# Patient Record
Sex: FEMALE | Race: WHITE | URBAN - METROPOLITAN AREA
[De-identification: names, ages, dates, MRNs, and addresses within clinical notes are randomized per-mention and may not be internally consistent; named-entity substitution may affect disease eponyms.]

---

## 2020-04-29 ENCOUNTER — TELEPHONE (OUTPATIENT)
Dept: PRIMARY CARE CLINIC | Age: 80
End: 2020-04-29

## 2020-04-29 NOTE — TELEPHONE ENCOUNTER
Attempted to call patient on [phone] and the number on her chart. I left 3 messages asking patient to call office to update number so office can get ahold of patient to start VV. Devin Murdock MA sent a my chart message as well with same info. If patient calls back please get the correct number  And update chart.

## 2020-05-01 ENCOUNTER — TELEMEDICINE (OUTPATIENT)
Dept: PRIMARY CARE CLINIC | Age: 80
End: 2020-05-01
Payer: COMMERCIAL

## 2020-05-01 ENCOUNTER — TELEPHONE (OUTPATIENT)
Dept: PRIMARY CARE CLINIC | Age: 80
End: 2020-05-01

## 2020-05-01 VITALS
DIASTOLIC BLOOD PRESSURE: 69 MMHG | SYSTOLIC BLOOD PRESSURE: 111 MMHG | HEART RATE: 97 BPM | WEIGHT: 153 LBS | HEIGHT: 62 IN | BODY MASS INDEX: 28.16 KG/M2

## 2020-05-01 PROBLEM — M54.9 BACKACHE: Status: ACTIVE | Noted: 2020-05-01

## 2020-05-01 PROBLEM — E11.9 TYPE 2 DIABETES MELLITUS (HCC): Status: ACTIVE | Noted: 2020-05-01

## 2020-05-01 PROBLEM — I99.8 VASCULAR INSUFFICIENCY: Status: ACTIVE | Noted: 2020-05-01

## 2020-05-01 PROBLEM — Z92.3 HISTORY OF RADIATION THERAPY: Status: ACTIVE | Noted: 2020-05-01

## 2020-05-01 PROBLEM — M79.605 PAIN OF LEFT LEG: Status: ACTIVE | Noted: 2020-05-01

## 2020-05-01 PROBLEM — E11.40 DIABETIC NEUROPATHY (HCC): Status: ACTIVE | Noted: 2020-05-01

## 2020-05-01 PROBLEM — I10 ESSENTIAL HYPERTENSION: Status: ACTIVE | Noted: 2020-05-01

## 2020-05-01 PROCEDURE — 99205 OFFICE O/P NEW HI 60 MIN: CPT | Performed by: PHYSICIAN ASSISTANT

## 2020-05-01 PROCEDURE — G0444 DEPRESSION SCREEN ANNUAL: HCPCS | Performed by: PHYSICIAN ASSISTANT

## 2020-05-01 RX ORDER — GLIMEPIRIDE 2 MG/1
2 TABLET ORAL EVERY 12 HOURS
Qty: 30 TABLET | Refills: 0 | Status: SHIPPED
Start: 2020-05-01 | End: 2020-05-01 | Stop reason: DRUGHIGH

## 2020-05-01 RX ORDER — GLIMEPIRIDE 2 MG/1
TABLET ORAL
COMMUNITY
Start: 2020-04-16 | End: 2020-05-01 | Stop reason: DRUGHIGH

## 2020-05-01 RX ORDER — GLIMEPIRIDE 2 MG/1
4 TABLET ORAL EVERY 12 HOURS
Qty: 30 TABLET | Refills: 0 | Status: SHIPPED
Start: 2020-05-01 | End: 2021-11-16 | Stop reason: SDUPTHER

## 2020-05-01 RX ORDER — TRAMADOL HYDROCHLORIDE 50 MG/1
50 TABLET ORAL EVERY 4 HOURS PRN
Qty: 30 TABLET | Refills: 0 | Status: SHIPPED | OUTPATIENT
Start: 2020-05-01 | End: 2020-12-15 | Stop reason: SDUPTHER

## 2020-05-01 RX ORDER — INSULIN DETEMIR 100 [IU]/ML
10 INJECTION, SOLUTION SUBCUTANEOUS NIGHTLY
Qty: 5 PEN | Refills: 3 | Status: SHIPPED | OUTPATIENT
Start: 2020-05-01 | End: 2020-12-15

## 2020-05-01 RX ORDER — INSULIN DETEMIR 100 [IU]/ML
INJECTION, SOLUTION SUBCUTANEOUS
COMMUNITY
Start: 2020-04-24 | End: 2020-05-01 | Stop reason: ALTCHOICE

## 2020-05-01 RX ORDER — LINAGLIPTIN 5 MG/1
5 TABLET, FILM COATED ORAL DAILY
Qty: 30 TABLET | Refills: 0 | Status: SHIPPED
Start: 2020-05-01 | End: 2022-04-26 | Stop reason: SDUPTHER

## 2020-05-01 RX ORDER — ASPIRIN 81 MG/1
1 TABLET, CHEWABLE ORAL DAILY
COMMUNITY
End: 2020-07-23 | Stop reason: ALTCHOICE

## 2020-05-01 RX ORDER — LISINOPRIL 10 MG/1
TABLET ORAL
COMMUNITY
Start: 2020-02-17 | End: 2021-01-05 | Stop reason: SDUPTHER

## 2020-05-01 RX ORDER — LINAGLIPTIN 5 MG/1
TABLET, FILM COATED ORAL
COMMUNITY
Start: 2020-04-02 | End: 2020-05-01 | Stop reason: DRUGHIGH

## 2020-05-01 ASSESSMENT — PATIENT HEALTH QUESTIONNAIRE - PHQ9
6. FEELING BAD ABOUT YOURSELF - OR THAT YOU ARE A FAILURE OR HAVE LET YOURSELF OR YOUR FAMILY DOWN: 2
9. THOUGHTS THAT YOU WOULD BE BETTER OFF DEAD, OR OF HURTING YOURSELF: 0
SUM OF ALL RESPONSES TO PHQ QUESTIONS 1-9: 13
7. TROUBLE CONCENTRATING ON THINGS, SUCH AS READING THE NEWSPAPER OR WATCHING TELEVISION: 2
10. IF YOU CHECKED OFF ANY PROBLEMS, HOW DIFFICULT HAVE THESE PROBLEMS MADE IT FOR YOU TO DO YOUR WORK, TAKE CARE OF THINGS AT HOME, OR GET ALONG WITH OTHER PEOPLE: 2
SUM OF ALL RESPONSES TO PHQ9 QUESTIONS 1 & 2: 4
SUM OF ALL RESPONSES TO PHQ QUESTIONS 1-9: 13
8. MOVING OR SPEAKING SO SLOWLY THAT OTHER PEOPLE COULD HAVE NOTICED. OR THE OPPOSITE, BEING SO FIGETY OR RESTLESS THAT YOU HAVE BEEN MOVING AROUND A LOT MORE THAN USUAL: 1
3. TROUBLE FALLING OR STAYING ASLEEP: 2
1. LITTLE INTEREST OR PLEASURE IN DOING THINGS: 2
5. POOR APPETITE OR OVEREATING: 0
2. FEELING DOWN, DEPRESSED OR HOPELESS: 2
4. FEELING TIRED OR HAVING LITTLE ENERGY: 2

## 2020-05-01 ASSESSMENT — ENCOUNTER SYMPTOMS
VOICE CHANGE: 0
VOMITING: 0
COUGH: 0
ABDOMINAL PAIN: 0
EYE ITCHING: 0
BLOOD IN STOOL: 0
CONSTIPATION: 0
DIARRHEA: 0
NAUSEA: 0
TROUBLE SWALLOWING: 0
COLOR CHANGE: 0
CHEST TIGHTNESS: 0
BACK PAIN: 0
SHORTNESS OF BREATH: 0
EYE PAIN: 0

## 2020-05-01 NOTE — PROGRESS NOTES
[facility] PRIMARY CARE  [address] [place] [id number]  Dept: Cinthya is a 78 y.o. female who presents today as an new patient for her medical conditionsas noted below. Chief Complaint   Patient presents with    New Patient     Patient use to see Elsa Santoyo with [facility].  Other     Patient was in [facility] for 1 week, last week. 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 Patient was given predinsone but has stopped taking today due to it making her legs feel heavy.  Diabetes     patient has been having high sugar reading     Other     patient consents to telehealth services. HPI:     HPI  VV takes place between myself, Michael and Leobardo, her daughter  from her home and my office     Patient is Type 2 diabetic. Last A1C was ? Not checking sugar readings. New meter won't read---too high. She has dry mouth, is very thristy and urinating a lot. Was at [facility] for blindness in left eye. Did temporal artery biopsy bilateral--awaiting results. Seeing ophthalmology soon. Prednisone gave her side effects so she stopped it. Leg pain has been bad \"for years\". Pain level is 9/10 and leg is weak. Worse at night. Gets N/T into the left leg off and on. Back bothers her with leg pain. Tylenol 500mg at night seems to help.    No redness or warmth and it is not swollen compared to the right per daughter    No results found for: Doris Marvin    (goal LDL is <100)   No results found for: AST, ALT, BUN  BP Readings from Last 3 Encounters:   05/01/20 111/69          (goal 120/80)    Past Medical History:   Diagnosis Date    Cancer [facility])     breast cancer    Hypertension     Ischemic optic neuropathy     Type 2 diabetes mellitus without complication ([facility] [address]       Past Surgical History:   Procedure Laterality Date    BACK SURGERY      BREAST polyuria. Negative for cold intolerance and heat intolerance. Genitourinary: Negative for difficulty urinating, dysuria, flank pain, frequency, hematuria, menstrual problem, pelvic pain, urgency, vaginal bleeding, vaginal discharge and vaginal pain. Musculoskeletal: Positive for arthralgias (left leg and knee). Negative for back pain, joint swelling and myalgias. Skin: Negative for color change, rash and wound. Neurological: Positive for weakness. Negative for dizziness, syncope, speech difficulty, light-headedness and headaches. Hematological: Does not bruise/bleed easily. Psychiatric/Behavioral: Negative for dysphoric mood and sleep disturbance. The patient is not nervous/anxious. Objective:     /69 (Site: Right Upper Arm)   Pulse 97   Ht 5' 2\" (1.575 m)   Wt 153 lb (69.4 kg)   BMI 27.98 kg/m²   Physical Exam  Vitals signs and nursing note reviewed. Constitutional:       Appearance: Normal appearance. Pulmonary:      Effort: Pulmonary effort is normal. No respiratory distress. Musculoskeletal:      Right lower leg: No edema. Left lower leg: No edema. Comments: Had daughter push on toes for cap refill which appears slow and daughter says the left foot is cooler than the other. She has a collection of spider vein clusters around ankle of the left foot. Skin:     Findings: No rash. Neurological:      Mental Status: She is alert and oriented to person, place, and time. Comments: Has some difficulty hearing. Psychiatric:         Mood and Affect: Mood normal.         Assessment:       Diagnosis Orders   1. Pain of left leg  CBC Auto Differential   2. Essential hypertension  Comprehensive Metabolic Panel, Fasting   3. Type 2 diabetes mellitus with hyperglycemia, without long-term current use of insulin ([facility])  insulin detemir (LEVEMIR FLEXTOUCH) 100 UNIT/ML injection pen    Comprehensive Metabolic Panel, Fasting    Hemoglobin A1C   4.  Diabetic mononeuropathy

## 2020-05-27 ENCOUNTER — TELEPHONE (OUTPATIENT)
Dept: PRIMARY CARE CLINIC | Age: 80
End: 2020-05-27

## 2020-05-27 NOTE — TELEPHONE ENCOUNTER
[facility] calling asking if you will follow patient for home health she is being discharged from [facility]. [phone]

## 2020-06-26 ENCOUNTER — TELEPHONE (OUTPATIENT)
Dept: PRIMARY CARE CLINIC | Age: 80
End: 2020-06-26

## 2020-07-01 ENCOUNTER — TELEPHONE (OUTPATIENT)
Dept: PRIMARY CARE CLINIC | Age: 80
End: 2020-07-01

## 2020-07-01 NOTE — TELEPHONE ENCOUNTER
Pt had ilateral leg edema yesterday, home nurse boyd for [facility] and it was sent to [facility] to be ran.

## 2020-07-02 ENCOUNTER — TELEPHONE (OUTPATIENT)
Dept: PRIMARY CARE CLINIC | Age: 80
End: 2020-07-02

## 2020-07-02 NOTE — TELEPHONE ENCOUNTER
Afia from [employer]. Pt has +2 edema on both lower extremities. Pt not on any water pills due to her kidneys. Uses [facility] in [place] listed.  [phone] Afia's#

## 2020-07-10 LAB
ALBUMIN SERPL-MCNC: NORMAL G/DL
ALP BLD-CCNC: NORMAL U/L
ALT SERPL-CCNC: NORMAL U/L
AST SERPL-CCNC: NORMAL U/L
AVERAGE GLUCOSE: NORMAL
BASOPHILS ABSOLUTE: NORMAL
BASOPHILS RELATIVE PERCENT: NORMAL
BILIRUB SERPL-MCNC: NORMAL MG/DL
BUN BLDV-MCNC: NORMAL MG/DL
CALCIUM SERPL-MCNC: NORMAL MG/DL
CHLORIDE BLD-SCNC: NORMAL MMOL/L
CHOLESTEROL, TOTAL: 211 MG/DL
CHOLESTEROL/HDL RATIO: 7
CO2: NORMAL
CREAT SERPL-MCNC: NORMAL MG/DL
EOSINOPHILS ABSOLUTE: NORMAL
EOSINOPHILS RELATIVE PERCENT: NORMAL
GLUCOSE FASTING: 102 MG/DL
HBA1C MFR BLD: 6.3 %
HCT VFR BLD CALC: NORMAL %
HDLC SERPL-MCNC: 30 MG/DL (ref 35–70)
HEMOGLOBIN: NORMAL
LDL CHOLESTEROL CALCULATED: 141 MG/DL (ref 0–160)
LYMPHOCYTES ABSOLUTE: NORMAL
LYMPHOCYTES RELATIVE PERCENT: NORMAL
MCH RBC QN AUTO: NORMAL PG
MCHC RBC AUTO-ENTMCNC: NORMAL G/DL
MCV RBC AUTO: NORMAL FL
MONOCYTES ABSOLUTE: NORMAL
MONOCYTES RELATIVE PERCENT: NORMAL
NEUTROPHILS ABSOLUTE: NORMAL
NEUTROPHILS RELATIVE PERCENT: NORMAL
PDW BLD-RTO: NORMAL %
PLATELET # BLD: NORMAL 10*3/UL
PMV BLD AUTO: NORMAL FL
POTASSIUM SERPL-SCNC: NORMAL MMOL/L
RBC # BLD: NORMAL 10*6/UL
SODIUM BLD-SCNC: NORMAL MMOL/L
TOTAL PROTEIN: NORMAL
TRIGL SERPL-MCNC: 198 MG/DL
VLDLC SERPL CALC-MCNC: 40 MG/DL
WBC # BLD: NORMAL 10*3/UL

## 2020-07-23 ENCOUNTER — OFFICE VISIT (OUTPATIENT)
Dept: PRIMARY CARE CLINIC | Age: 80
End: 2020-07-23
Payer: COMMERCIAL

## 2020-07-23 VITALS
HEART RATE: 95 BPM | HEIGHT: 62 IN | DIASTOLIC BLOOD PRESSURE: 74 MMHG | TEMPERATURE: 98.1 F | SYSTOLIC BLOOD PRESSURE: 122 MMHG | OXYGEN SATURATION: 98 % | WEIGHT: 145 LBS | BODY MASS INDEX: 26.68 KG/M2

## 2020-07-23 PROBLEM — H26.9 CATARACT: Status: ACTIVE | Noted: 2020-07-23

## 2020-07-23 LAB
BASOPHILS ABSOLUTE: NORMAL
BASOPHILS RELATIVE PERCENT: NORMAL
BILIRUBIN, POC: NORMAL
BLOOD URINE, POC: NORMAL
BUN BLDV-MCNC: NORMAL MG/DL
CALCIUM SERPL-MCNC: NORMAL MG/DL
CHLORIDE BLD-SCNC: NORMAL MMOL/L
CLARITY, POC: NORMAL
CO2: NORMAL
COLOR, POC: NORMAL
CREAT SERPL-MCNC: NORMAL MG/DL
EOSINOPHILS ABSOLUTE: NORMAL
EOSINOPHILS RELATIVE PERCENT: NORMAL
GFR CALCULATED: NORMAL
GLUCOSE BLD-MCNC: NORMAL MG/DL
GLUCOSE URINE, POC: NORMAL
HCT VFR BLD CALC: NORMAL %
HEMOGLOBIN: NORMAL
KETONES, POC: NORMAL
LEUKOCYTE EST, POC: NORMAL
LYMPHOCYTES ABSOLUTE: NORMAL
LYMPHOCYTES RELATIVE PERCENT: NORMAL
MCH RBC QN AUTO: NORMAL PG
MCHC RBC AUTO-ENTMCNC: NORMAL G/DL
MCV RBC AUTO: NORMAL FL
MONOCYTES ABSOLUTE: NORMAL
MONOCYTES RELATIVE PERCENT: NORMAL
NEUTROPHILS ABSOLUTE: NORMAL
NEUTROPHILS RELATIVE PERCENT: NORMAL
NITRITE, POC: NORMAL
PDW BLD-RTO: NORMAL %
PH, POC: 6.5
PLATELET # BLD: NORMAL 10*3/UL
PMV BLD AUTO: NORMAL FL
POTASSIUM SERPL-SCNC: NORMAL MMOL/L
PROTEIN, POC: NORMAL
RBC # BLD: NORMAL 10*6/UL
SODIUM BLD-SCNC: NORMAL MMOL/L
SPECIFIC GRAVITY, POC: 1.02
UROBILINOGEN, POC: NORMAL
WBC # BLD: NORMAL 10*3/UL

## 2020-07-23 PROCEDURE — 99215 OFFICE O/P EST HI 40 MIN: CPT | Performed by: PHYSICIAN ASSISTANT

## 2020-07-23 PROCEDURE — 81003 URINALYSIS AUTO W/O SCOPE: CPT | Performed by: PHYSICIAN ASSISTANT

## 2020-07-23 RX ORDER — ATORVASTATIN CALCIUM 40 MG/1
40 TABLET, FILM COATED ORAL DAILY
Qty: 30 TABLET | Refills: 5 | Status: SHIPPED | OUTPATIENT
Start: 2020-07-23 | End: 2020-12-23

## 2020-07-23 RX ORDER — ATORVASTATIN CALCIUM 40 MG/1
40 TABLET, FILM COATED ORAL NIGHTLY
Qty: 90 TABLET | Refills: 0 | Status: CANCELLED | OUTPATIENT
Start: 2020-07-23

## 2020-07-23 RX ORDER — GABAPENTIN 100 MG/1
100 CAPSULE ORAL 3 TIMES DAILY
COMMUNITY
Start: 2020-06-24 | End: 2020-07-23 | Stop reason: SDUPTHER

## 2020-07-23 RX ORDER — SULFAMETHOXAZOLE AND TRIMETHOPRIM 800; 160 MG/1; MG/1
1 TABLET ORAL 2 TIMES DAILY
Qty: 14 TABLET | Refills: 0 | Status: SHIPPED | OUTPATIENT
Start: 2020-07-23 | End: 2020-07-30

## 2020-07-23 RX ORDER — LANOLIN ALCOHOL/MO/W.PET/CERES
325 CREAM (GRAM) TOPICAL 2 TIMES DAILY
Qty: 90 TABLET | Refills: 3
Start: 2020-07-23 | End: 2021-05-04 | Stop reason: ALTCHOICE

## 2020-07-23 RX ORDER — GABAPENTIN 100 MG/1
100 CAPSULE ORAL 3 TIMES DAILY
Qty: 90 CAPSULE | Refills: 0 | Status: SHIPPED | OUTPATIENT
Start: 2020-07-23 | End: 2021-01-05 | Stop reason: ALTCHOICE

## 2020-07-23 ASSESSMENT — ENCOUNTER SYMPTOMS
SHORTNESS OF BREATH: 0
WHEEZING: 0
RESPIRATORY NEGATIVE: 1
COUGH: 0

## 2020-07-23 NOTE — PROGRESS NOTES
atorvastatin (LIPITOR) 40 MG tablet Take 1 tablet by mouth daily 30 tablet 5    ferrous sulfate (FE TABS) 325 (65 Fe) MG EC tablet Take 1 tablet by mouth 2 times daily 90 tablet 3    gabapentin (NEURONTIN) 100 MG capsule Take 1 capsule by mouth 3 times daily for 30 days. 90 capsule 0    sulfamethoxazole-trimethoprim (BACTRIM DS) 800-160 MG per tablet Take 1 tablet by mouth 2 times daily for 7 days 14 tablet 0    TRADJENTA 5 MG tablet 1 tablet daily 30 tablet 0    glimepiride (AMARYL) 2 MG tablet 2 tablets every 12 hours 30 tablet 0    insulin detemir (LEVEMIR FLEXTOUCH) 100 UNIT/ML injection pen Inject 10 Units into the skin nightly 5 pen 3    lisinopril (PRINIVIL;ZESTRIL) 10 MG tablet lisinopril 10 mg tablet       No current facility-administered medications for this visit. No Known Allergies    Health Maintenance   Topic Date Due    DTaP/Tdap/Td vaccine (1 - Tdap) 09/05/1959    Shingles Vaccine (1 of 2) 09/05/1990    DEXA (modify frequency per FRAX score)  09/05/1995    Annual Wellness Visit (AWV)  07/15/2020    Flu vaccine (1) 09/01/2020    Potassium monitoring  07/10/2021    Creatinine monitoring  07/10/2021    Pneumococcal 65+ years Vaccine  Completed    Hepatitis A vaccine  Aged Out    Hib vaccine  Aged Out    Meningococcal (ACWY) vaccine  Aged Out       Subjective:      Review of Systems   Constitutional: Positive for fatigue. Negative for chills, diaphoresis and fever. HENT: Negative. Respiratory: Negative. Negative for cough, shortness of breath and wheezing. Cardiovascular: Negative. Endocrine: Negative. Genitourinary: Negative for hematuria and vaginal bleeding. Skin: Positive for pallor. Neurological: Positive for weakness. Negative for dizziness, syncope and light-headedness.        Objective:     /74   Pulse 95   Temp 98.1 °F (36.7 °C)   Ht 5' 2\" (1.575 m)   Wt 145 lb (65.8 kg)   SpO2 98%   BMI 26.52 kg/m²   Physical Exam  Vitals signs and nursing note reviewed. Constitutional:       Appearance: Normal appearance. She is not ill-appearing. Cardiovascular:      Rate and Rhythm: Normal rate and regular rhythm. Heart sounds: Normal heart sounds. Pulmonary:      Effort: Pulmonary effort is normal.      Breath sounds: Normal breath sounds. Abdominal:      General: Abdomen is flat. Bowel sounds are normal. There is no distension. Tenderness: There is no abdominal tenderness. There is no guarding or rebound. Musculoskeletal:      Right lower leg: No edema. Left lower leg: No edema. Neurological:      Mental Status: She is alert and oriented to person, place, and time. Psychiatric:         Mood and Affect: Mood normal.         Assessment:       Diagnosis Orders   1. Type 2 diabetes mellitus with hyperglycemia, without long-term current use of insulin ([facility] [address]     2. Hyperlipidemia, unspecified hyperlipidemia type  Lipid Panel    ALT    AST   3. Essential hypertension  Basic Metabolic Panel   4. Low hemoglobin  CBC Auto Differential   5. Urinary catheter present  POCT Urinalysis No Micro (Auto)   6. Urinary tract infection symptoms  Culture, Urine        Plan:    Reviewed [facility] records  Urine today shows UTI--complete Bactrim and sent for culture. CBC and BMP drawn today  Continue iron--may stop to to GI upset if blood work is better. Hydrate well   Start Lipitor 40mg 1 po qhs  Labs in 6 weeks. Spent 45 minutes with patient today  Return in about 3 months (around 10/23/2020) for Diabetes, HTN. Orders Placed This Encounter   Procedures    Culture, Urine     Standing Status:   Future     Standing Expiration Date:   7/23/2021     Order Specific Question:   Specify (ex-cath, midstream, cysto, etc)?      Answer:   midstream    Basic Metabolic Panel     Standing Status:   Future     Standing Expiration Date:   7/23/2021    CBC Auto Differential     Standing Status:   Future     Standing Expiration Date:   7/23/2021    Lipid Panel Standing Status:   Future     Standing Expiration Date:   7/23/2021     Order Specific Question:   Is Patient Fasting?/# of Hours     Answer:   10-12 hours    ALT     Standing Status:   Future     Standing Expiration Date:   7/23/2021    AST     Standing Status:   Future     Standing Expiration Date:   7/23/2021    POCT Urinalysis No Micro (Auto)     Orders Placed This Encounter   Medications    atorvastatin (LIPITOR) 40 MG tablet     Sig: Take 1 tablet by mouth daily     Dispense:  30 tablet     Refill:  5    ferrous sulfate (FE TABS) 325 (65 Fe) MG EC tablet     Sig: Take 1 tablet by mouth 2 times daily     Dispense:  90 tablet     Refill:  3    gabapentin (NEURONTIN) 100 MG capsule     Sig: Take 1 capsule by mouth 3 times daily for 30 days. Dispense:  90 capsule     Refill:  0    sulfamethoxazole-trimethoprim (BACTRIM DS) 800-160 MG per tablet     Sig: Take 1 tablet by mouth 2 times daily for 7 days     Dispense:  14 tablet     Refill:  0       Patient given educationalmaterials - see patient instructions. Discussed use, benefit, and side effectsof prescribed medications. All patient questions answered. Pt voiced understanding. Reviewed health maintenance. Instructed to continue current medications, diet andexercise. Patient agreed with treatment plan. Follow up as directed.      Electronicallysigned by Carmine Hayes PA-C on 7/23/2020 at 12:41 PM

## 2020-08-20 LAB
ABSOLUTE BASO #: 0.1 X10E9/L (ref 0–0.2)
ABSOLUTE EOS #: 0.3 X10E9/L (ref 0–0.4)
ABSOLUTE LYMPH #: 2 X10E9/L (ref 1–3.5)
ABSOLUTE MONO #: 0.4 X10E9/L (ref 0–0.9)
ABSOLUTE NEUT #: 6.8 X10E9/L (ref 1.5–6.6)
ANION GAP SERPL CALCULATED.3IONS-SCNC: 12 MMOL/L (ref 5–15)
BASOPHILS RELATIVE PERCENT: 1.2 %
BUN BLDV-MCNC: 32 MG/DL (ref 5–27)
CALCIUM SERPL-MCNC: 9.4 MG/DL (ref 8.5–10.5)
CHLORIDE BLD-SCNC: 111 MMOL/L (ref 98–109)
CO2: 22 MMOL/L (ref 22–32)
CREAT SERPL-MCNC: 1.36 MG/DL (ref 0.4–1)
EGFR AFRICAN AMERICAN: 45 ML/MIN/1.73SQ.M
EGFR IF NONAFRICAN AMERICAN: 38 ML/MIN/1.73SQ.M
EOSINOPHILS RELATIVE PERCENT: 3.4 %
GLUCOSE: 191 MG/DL (ref 65–99)
HCT VFR BLD CALC: 32.3 % (ref 35–47)
HEMOGLOBIN: 11 G/DL (ref 11.7–15.5)
LYMPHOCYTE %: 21 %
MCH RBC QN AUTO: 30.4 PG (ref 27–34)
MCHC RBC AUTO-ENTMCNC: 34.1 G/DL (ref 32–36)
MCV RBC AUTO: 89 FL (ref 80–100)
MONOCYTES # BLD: 4.3 %
NEUTROPHILS RELATIVE PERCENT: 70.1 %
PDW BLD-RTO: 13.8 % (ref 11.5–15)
PLATELETS: 196 X10E9/L (ref 150–450)
PMV BLD AUTO: 10.2 FL (ref 7–12)
POTASSIUM SERPL-SCNC: 5.2 MMOL/L (ref 3.5–5)
RBC: 3.63 X10E12/L (ref 3.8–5.2)
SODIUM BLD-SCNC: 145 MMOL/L (ref 134–146)
WBC: 9.6 X10E9/L (ref 4–11)

## 2020-09-03 ENCOUNTER — TELEPHONE (OUTPATIENT)
Dept: PRIMARY CARE CLINIC | Age: 80
End: 2020-09-03

## 2020-09-03 NOTE — TELEPHONE ENCOUNTER
Bashir Samuel [facility] calling and states that Usama Castillo will not be able to follow pt for [facility] and is asking which MD would be willing to follow for [facility]. Please advise.

## 2020-09-04 ENCOUNTER — TELEPHONE (OUTPATIENT)
Dept: PRIMARY CARE CLINIC | Age: 80
End: 2020-09-04

## 2020-09-04 NOTE — TELEPHONE ENCOUNTER
Gina Goetz calling back to check on the status of this request- Gina Goetz notified that Dr. Luis Gee will sign/follow

## 2020-09-11 LAB
BUN BLDV-MCNC: NORMAL MG/DL
CALCIUM SERPL-MCNC: NORMAL MG/DL
CHLORIDE BLD-SCNC: NORMAL MMOL/L
CO2: NORMAL
CREAT SERPL-MCNC: NORMAL MG/DL
GFR CALCULATED: NORMAL
GLUCOSE BLD-MCNC: NORMAL MG/DL
HCT VFR BLD CALC: NORMAL %
HEMOGLOBIN: NORMAL
POTASSIUM SERPL-SCNC: NORMAL MMOL/L
SODIUM BLD-SCNC: NORMAL MMOL/L

## 2020-09-25 ENCOUNTER — TELEPHONE (OUTPATIENT)
Dept: PRIMARY CARE CLINIC | Age: 80
End: 2020-09-25

## 2020-09-25 NOTE — TELEPHONE ENCOUNTER
Pt canceled her P.T. appt today per Sandy Yip at [facility]. due to her son being in from out of town and wants to spend time with him.

## 2020-10-10 LAB
ANION GAP SERPL CALCULATED.3IONS-SCNC: 9 MMOL/L (ref 5–15)
BUN BLDV-MCNC: 34 MG/DL (ref 5–27)
CALCIUM SERPL-MCNC: 9.4 MG/DL (ref 8.5–10.5)
CHLORIDE BLD-SCNC: 105 MMOL/L (ref 98–109)
CO2: 25 MMOL/L (ref 22–32)
CREAT SERPL-MCNC: 1.35 MG/DL (ref 0.4–1)
EGFR AFRICAN AMERICAN: 46 ML/MIN/1.73SQ.M
EGFR IF NONAFRICAN AMERICAN: 38 ML/MIN/1.73SQ.M
GLUCOSE: 131 MG/DL (ref 65–99)
POTASSIUM SERPL-SCNC: 4.4 MMOL/L (ref 3.5–5)
SODIUM BLD-SCNC: 139 MMOL/L (ref 134–146)

## 2020-11-05 LAB
ANION GAP SERPL CALCULATED.3IONS-SCNC: 11 MMOL/L (ref 5–15)
BUN BLDV-MCNC: 21 MG/DL (ref 5–27)
CALCIUM SERPL-MCNC: 9 MG/DL (ref 8.5–10.5)
CHLORIDE BLD-SCNC: 107 MMOL/L (ref 98–109)
CO2: 24 MMOL/L (ref 22–32)
CREAT SERPL-MCNC: 1.17 MG/DL (ref 0.4–1)
EGFR AFRICAN AMERICAN: 54 ML/MIN/1.73SQ.M
EGFR IF NONAFRICAN AMERICAN: 45 ML/MIN/1.73SQ.M
GLUCOSE: 195 MG/DL (ref 65–99)
POTASSIUM SERPL-SCNC: 4.2 MMOL/L (ref 3.5–5)
SODIUM BLD-SCNC: 142 MMOL/L (ref 134–146)

## 2020-12-14 ENCOUNTER — NURSE TRIAGE (OUTPATIENT)
Dept: OTHER | Facility: CLINIC | Age: 80
End: 2020-12-14

## 2020-12-14 NOTE — TELEPHONE ENCOUNTER
Patient called pre-service center [facility] with red flag complaint. Brief description of triage: worsening low back pain, blood on toilet paper after wiping when urinates. Triage indicates for patient to be seen today. Care advice provided, patient verbalizes understanding; denies any other questions or concerns; instructed to call back for any new or worsening symptoms. Writer provided warm transfer to [place] at [facility] [place] for appointment scheduling. Attention Provider: Thank you for allowing me to participate in the care of your patient. The patient was connected to triage in response to information provided to the [facility]. Please do not respond through this encounter as the response is not directed to a shared pool. Reason for Disposition   Side (flank) or lower back pain present    Answer Assessment - Initial Assessment Questions  1. SYMPTOM: \"What's the main symptom you're concerned about? \" (e.g., frequency, incontinence)      Low back pain. 2. ONSET: \"When did the  Low back pain start? \"      Started 2 weeks ago. 3. PAIN: \"Is there any pain? \" If so, ask: \"How bad is it? \" (Scale: 1-10; mild, moderate, severe)      Yes, back pain. 8/10    4. CAUSE: \"What do you think is causing the symptoms? \"      \"UTI\"    5. OTHER SYMPTOMS: \"Do you have any other symptoms? \" (e.g., fever, flank pain, blood in urine, pain with urination)      Blood on toilet paper when wipes after urinating. 6. PREGNANCY: \"Is there any chance you are pregnant? \" \"When was your last menstrual period? \"      No.    Protocols used: URINARY SYMPTOMS-ADULT-[place]

## 2020-12-15 ENCOUNTER — OFFICE VISIT (OUTPATIENT)
Dept: PRIMARY CARE CLINIC | Age: 80
End: 2020-12-15
Payer: COMMERCIAL

## 2020-12-15 VITALS
BODY MASS INDEX: 27.79 KG/M2 | TEMPERATURE: 97.1 F | HEART RATE: 105 BPM | SYSTOLIC BLOOD PRESSURE: 128 MMHG | DIASTOLIC BLOOD PRESSURE: 80 MMHG | WEIGHT: 151 LBS | HEIGHT: 62 IN | OXYGEN SATURATION: 98 %

## 2020-12-15 LAB
BILIRUBIN, POC: NORMAL
BLOOD URINE, POC: NORMAL
CLARITY, POC: NORMAL
COLOR, POC: NORMAL
GLUCOSE URINE, POC: NORMAL
HBA1C MFR BLD: 9.9 %
KETONES, POC: NORMAL
LEUKOCYTE EST, POC: NORMAL
NITRITE, POC: NORMAL
PH, POC: 6
PROTEIN, POC: NORMAL
SPECIFIC GRAVITY, POC: 1.01
UROBILINOGEN, POC: 0.2

## 2020-12-15 PROCEDURE — 99214 OFFICE O/P EST MOD 30 MIN: CPT | Performed by: PHYSICIAN ASSISTANT

## 2020-12-15 PROCEDURE — 90694 VACC AIIV4 NO PRSRV 0.5ML IM: CPT | Performed by: PHYSICIAN ASSISTANT

## 2020-12-15 PROCEDURE — G0008 ADMIN INFLUENZA VIRUS VAC: HCPCS | Performed by: PHYSICIAN ASSISTANT

## 2020-12-15 PROCEDURE — 83036 HEMOGLOBIN GLYCOSYLATED A1C: CPT | Performed by: PHYSICIAN ASSISTANT

## 2020-12-15 RX ORDER — INSULIN DETEMIR 100 [IU]/ML
INJECTION, SOLUTION SUBCUTANEOUS
Qty: 5 PEN | Refills: 3 | Status: SHIPPED | OUTPATIENT
Start: 2020-12-15 | End: 2021-03-04

## 2020-12-15 RX ORDER — TRAMADOL HYDROCHLORIDE 50 MG/1
50 TABLET ORAL EVERY 4 HOURS PRN
Qty: 30 TABLET | Refills: 0 | Status: SHIPPED | OUTPATIENT
Start: 2020-12-15 | End: 2020-12-20

## 2020-12-15 ASSESSMENT — ENCOUNTER SYMPTOMS
BLOOD IN STOOL: 0
RESPIRATORY NEGATIVE: 1
ABDOMINAL PAIN: 1
CONSTIPATION: 0
NAUSEA: 0
VOMITING: 0
DIARRHEA: 0

## 2020-12-15 NOTE — PROGRESS NOTES
[facility]  [address] [place] [id number]  Phone: [phone]  Fax: [phone]    Anderson Aldridge is a [de-identified] y.o. female who presents today for her medical conditions/complaintsas noted below. Chief Complaint   Patient presents with    Flank Pain     left side pain, pain started 1 week ago    Hematuria     blood in urine yesterday.  Abdominal Pain     lower abdominal pain. HPI:     HPI  Left flank pain started 1 week ago and yesterday she noticed a little blood (clot) in urine. Abdominal pain on left started a few days later. HX of kidney stone with stents in the past  No fever. NO N/V/D. Bowels are working normally. Urine shows glucosuria. Sugars running in 130s am fasting. A1C is 9.9    Current Outpatient Medications   Medication Sig Dispense Refill    traMADol (ULTRAM) 50 MG tablet Take 1 tablet by mouth every 4 hours as needed for Pain for up to 5 days. Intended supply: 5 days. Take lowest dose possible to manage pain 30 tablet 0    insulin detemir (LEVEMIR FLEXTOUCH) 100 UNIT/ML injection pen Take 15U at night 5 pen 3    TRADJENTA 5 MG tablet 1 tablet daily 30 tablet 0    glimepiride (AMARYL) 2 MG tablet 2 tablets every 12 hours 30 tablet 0    atorvastatin (LIPITOR) 40 MG tablet Take 1 tablet by mouth daily (Patient not taking: Reported on 12/15/2020) 30 tablet 5    ferrous sulfate (FE TABS) 325 (65 Fe) MG EC tablet Take 1 tablet by mouth 2 times daily (Patient not taking: Reported on 12/15/2020) 90 tablet 3    gabapentin (NEURONTIN) 100 MG capsule Take 1 capsule by mouth 3 times daily for 30 days. 90 capsule 0    lisinopril (PRINIVIL;ZESTRIL) 10 MG tablet lisinopril 10 mg tablet       No current facility-administered medications for this visit. No Known Allergies    Subjective:      Review of Systems   Constitutional: Negative for chills, diaphoresis and fever. Respiratory: Negative. Cardiovascular: Negative. Gastrointestinal: Positive for abdominal pain. Negative for blood in stool, constipation, diarrhea, nausea and vomiting. Genitourinary: Positive for flank pain, frequency and hematuria. Negative for difficulty urinating, dysuria and urgency. Occ accidents fro awhile       Objective:     /80   Pulse 105   Temp 97.1 °F (36.2 °C)   Ht 5' 2\" (1.575 m)   Wt 151 lb (68.5 kg)   SpO2 98%   BMI 27.62 kg/m²   Physical Exam  Vitals signs and nursing note reviewed. Constitutional:       General: She is not in acute distress. Appearance: Normal appearance. She is not ill-appearing. Cardiovascular:      Rate and Rhythm: Regular rhythm. Tachycardia present. Heart sounds: Normal heart sounds. Pulmonary:      Effort: Pulmonary effort is normal.      Breath sounds: Normal breath sounds. No wheezing, rhonchi or rales. Abdominal:      General: Abdomen is protuberant. Bowel sounds are normal.      Palpations: Abdomen is soft. Tenderness: There is abdominal tenderness in the right upper quadrant and right lower quadrant. There is guarding (on right). There is no right CVA tenderness, left CVA tenderness or rebound. Negative signs include Katz's sign and McBurney's sign. Neurological:      Mental Status: She is alert. Assessment:       Diagnosis Orders   1. Left flank pain     2. Hematuria, unspecified type  POCT Urinalysis no Micro    Culture, Urine   3. Need for vaccination  INFLUENZA, QUADV, ADJUVANTED, 65 YRS =, IM, PF, PREFILL SYR, 0.5ML (FLUAD)   4. Right lower quadrant abdominal pain     5. Type 2 diabetes mellitus with hyperglycemia, without long-term current use of insulin ([facility])  POCT glycosylated hemoglobin (Hb A1C)    insulin detemir (LEVEMIR FLEXTOUCH) 100 UNIT/ML injection pen   6. Chronic bilateral low back pain, unspecified whether sciatica present  traMADol (ULTRAM) 50 MG tablet   7.  Pain of left leg  traMADol (ULTRAM) 50 MG tablet        Plan: Send urine for culture  CT of Abdomen STAT to r/o kidney stones---she states she has no one to take her today. She might tomorrow. Likely passing a stone so given Tramadol that has helped her before  Increase water intake. If pain worsens, go to ER  Check A1C with glucosuria. Increased Levemir to 15 U qhs  Watch diet and get some activity  Flu vax today   Return in about 1 month (around 1/15/2021) for Diabetes. Orders Placed This Encounter   Procedures    Culture, Urine     Standing Status:   Future     Standing Expiration Date:   12/15/2021     Order Specific Question:   Specify (ex-cath, midstream, cysto, etc)? Answer:   midstream    INFLUENZA, QUADV, ADJUVANTED, 65 YRS =, IM, PF, PREFILL SYR, 0.5ML (FLUAD)    POCT Urinalysis no Micro    POCT glycosylated hemoglobin (Hb A1C)     Orders Placed This Encounter   Medications    traMADol (ULTRAM) 50 MG tablet     Sig: Take 1 tablet by mouth every 4 hours as needed for Pain for up to 5 days. Intended supply: 5 days.  Take lowest dose possible to manage pain     Dispense:  30 tablet     Refill:  0     Reduce doses taken as pain becomes manageable    insulin detemir (LEVEMIR FLEXTOUCH) 100 UNIT/ML injection pen     Sig: Take 15U at night     Dispense:  5 pen     Refill:  3           Electronically signed by Iggy Woodson 12/15/2020 at 9:07 AM

## 2020-12-16 ENCOUNTER — TELEPHONE (OUTPATIENT)
Dept: PRIMARY CARE CLINIC | Age: 80
End: 2020-12-16

## 2020-12-16 NOTE — TELEPHONE ENCOUNTER
Pt's daughter called in for us to get her stat ct scheduled. She stated Monday is the first day she is able to get a ride to get it done. She would like it done at [facility]. I called to schedule but was on hold for 10 minutes so I left a voicemail for them to call this office back.

## 2020-12-17 ENCOUNTER — TELEPHONE (OUTPATIENT)
Dept: PRIMARY CARE CLINIC | Age: 80
End: 2020-12-17

## 2020-12-17 NOTE — TELEPHONE ENCOUNTER
Pt's daughter called back and notified with message below. Gave her [facility] scheduling line to call and schedule the CT.

## 2020-12-17 NOTE — TELEPHONE ENCOUNTER
Tiburcio Avila with [address] calling back and states that the pt is unable to have the CT scan done through [facility] due to her insurance.

## 2020-12-17 NOTE — TELEPHONE ENCOUNTER
Muriel Mena W/ [facility] calling to request to have the CT of abdomen and pelvis along with clinical notes faxed to her. Viewed in chart and no order is wrote for this.  Order created/faxed

## 2020-12-30 DIAGNOSIS — R31.9 HEMATURIA, UNSPECIFIED TYPE: ICD-10-CM

## 2021-01-05 ENCOUNTER — TELEPHONE (OUTPATIENT)
Dept: PRIMARY CARE CLINIC | Age: 81
End: 2021-01-05

## 2021-01-05 ENCOUNTER — OFFICE VISIT (OUTPATIENT)
Dept: PRIMARY CARE CLINIC | Age: 81
End: 2021-01-05
Payer: COMMERCIAL

## 2021-01-05 VITALS
HEART RATE: 102 BPM | TEMPERATURE: 97.9 F | WEIGHT: 147 LBS | BODY MASS INDEX: 27.05 KG/M2 | OXYGEN SATURATION: 98 % | DIASTOLIC BLOOD PRESSURE: 78 MMHG | SYSTOLIC BLOOD PRESSURE: 126 MMHG | HEIGHT: 62 IN

## 2021-01-05 DIAGNOSIS — R10.32 LEFT LOWER QUADRANT ABDOMINAL PAIN: Primary | ICD-10-CM

## 2021-01-05 DIAGNOSIS — E11.65 TYPE 2 DIABETES MELLITUS WITH HYPERGLYCEMIA, WITHOUT LONG-TERM CURRENT USE OF INSULIN (HCC): ICD-10-CM

## 2021-01-05 DIAGNOSIS — D64.9 LOW HEMOGLOBIN: ICD-10-CM

## 2021-01-05 DIAGNOSIS — E78.5 HYPERLIPIDEMIA, UNSPECIFIED HYPERLIPIDEMIA TYPE: ICD-10-CM

## 2021-01-05 PROCEDURE — 99214 OFFICE O/P EST MOD 30 MIN: CPT | Performed by: PHYSICIAN ASSISTANT

## 2021-01-05 RX ORDER — ATORVASTATIN CALCIUM 40 MG/1
40 TABLET, FILM COATED ORAL DAILY
Qty: 30 TABLET | Refills: 11 | Status: SHIPPED | OUTPATIENT
Start: 2021-01-05 | End: 2022-01-06

## 2021-01-05 RX ORDER — LISINOPRIL 5 MG/1
5 TABLET ORAL DAILY
Qty: 30 TABLET | Refills: 5 | Status: SHIPPED | OUTPATIENT
Start: 2021-01-05 | End: 2021-03-04 | Stop reason: DRUGHIGH

## 2021-01-05 RX ORDER — TRAMADOL HYDROCHLORIDE 50 MG/1
50 TABLET ORAL EVERY 6 HOURS PRN
Qty: 20 TABLET | Refills: 0 | Status: SHIPPED | OUTPATIENT
Start: 2021-01-05 | End: 2021-01-10

## 2021-01-05 RX ORDER — LISINOPRIL 5 MG/1
TABLET ORAL
Qty: 30 TABLET | Refills: 5 | Status: SHIPPED
Start: 2021-01-05 | End: 2021-01-05 | Stop reason: SDUPTHER

## 2021-01-05 RX ORDER — LISINOPRIL 5 MG/1
TABLET ORAL
Qty: 30 TABLET | Refills: 5 | Status: SHIPPED | OUTPATIENT
Start: 2021-01-05 | End: 2021-01-05 | Stop reason: DRUGHIGH

## 2021-01-05 ASSESSMENT — ENCOUNTER SYMPTOMS
CONSTIPATION: 0
DIARRHEA: 0
RECTAL PAIN: 0
BLOOD IN STOOL: 0
ANAL BLEEDING: 0
ABDOMINAL DISTENTION: 0
VOMITING: 0
NAUSEA: 0
ABDOMINAL PAIN: 1

## 2021-01-05 ASSESSMENT — PATIENT HEALTH QUESTIONNAIRE - PHQ9
SUM OF ALL RESPONSES TO PHQ QUESTIONS 1-9: 2
SUM OF ALL RESPONSES TO PHQ9 QUESTIONS 1 & 2: 2

## 2021-01-05 NOTE — PROGRESS NOTES
[facility] PRIMARY CARE  [address] [address]  Dept: Cinthya is a [de-identified] y.o. female who presents today for her medical conditions/complaintsas noted below. Chief Complaint   Patient presents with    Flank Pain     pain in lower left side that comes radiates to the front of her stomach. Pt denies any pain with urniation or blood in urine. patient said the pain started 1 month ago. patient had CT done at [facility], results in chart.          HPI:     HPI  REviewed CT scan 12/21/20 in [facility]  Urine culture was normal.   Never had a colonoscopy    Still having left upper quadrant pain  LDL Calculated (mg/dL)   Date Value   07/10/2020 141       (goal LDL is <100)   BUN (mg/dL)   Date Value   11/04/2020 21     BP Readings from Last 3 Encounters:   01/05/21 126/78   12/15/20 128/80   07/23/20 122/74          (goal 120/80)    Past Medical History:   Diagnosis Date    Cancer [facility])     breast cancer    Hypertension     Ischemic optic neuropathy     Type 2 diabetes mellitus without complication ([facility] [address]       Past Surgical History:   Procedure Laterality Date    BACK SURGERY      BREAST SURGERY      lumpectomy left    HYSTERECTOMY, VAGINAL         Family History   Problem Relation Age of Onset    Heart Attack Father     Diabetes type 2  Daughter        Social History     Tobacco Use    Smoking status: Former Smoker     Packs/day: 0.50     Years: 10.00     Pack years: 5.00     Quit date: 1/1/2018     Years since quitting: 3.0    Smokeless tobacco: Never Used   Substance Use Topics    Alcohol use: Not Currently      Current Outpatient Medications   Medication Sig Dispense Refill    atorvastatin (LIPITOR) 40 MG tablet Take 1 tablet by mouth daily 30 tablet 11    lisinopril (PRINIVIL;ZESTRIL) 5 MG tablet lisinopril 10 mg tablet 30 tablet 5  traMADol (ULTRAM) 50 MG tablet Take 1 tablet by mouth every 6 hours as needed for Pain for up to 5 days. Intended supply: 5 days. Take lowest dose possible to manage pain 20 tablet 0    insulin detemir (LEVEMIR FLEXTOUCH) 100 UNIT/ML injection pen Take 15U at night 5 pen 3    TRADJENTA 5 MG tablet 1 tablet daily 30 tablet 0    glimepiride (AMARYL) 2 MG tablet 2 tablets every 12 hours 30 tablet 0    ferrous sulfate (FE TABS) 325 (65 Fe) MG EC tablet Take 1 tablet by mouth 2 times daily (Patient not taking: Reported on 12/15/2020) 90 tablet 3     No current facility-administered medications for this visit. No Known Allergies    Health Maintenance   Topic Date Due    DTaP/Tdap/Td vaccine (1 - Tdap) 09[birth year]05/1959    Shingles Vaccine (1 of 2) 09/05/1990    DEXA (modify frequency per FRAX score)  09/05/1995    Annual Wellness Visit (AWV)  07/15/2020    Lipid screen  07/10/2021    Potassium monitoring  11/04/2021    Creatinine monitoring  11/04/2021    Flu vaccine  Completed    Pneumococcal 65+ yrs at Risk Vaccine  Completed    Hepatitis A vaccine  Aged Out    Hib vaccine  Aged Out    Meningococcal (ACWY) vaccine  Aged Out       Subjective:      Review of Systems   Constitutional: Positive for appetite change. Negative for chills, diaphoresis and fever. Gastrointestinal: Positive for abdominal pain (around belly burtton ). Negative for abdominal distention, anal bleeding, blood in stool, constipation, diarrhea, nausea, rectal pain and vomiting. Endocrine: Positive for polydipsia and polyphagia. Genitourinary: Positive for flank pain. Negative for vaginal bleeding, vaginal discharge and vaginal pain. Objective:     /78   Pulse 102   Temp 97.9 °F (36.6 °C)   Ht 5' 2\" (1.575 m)   Wt 147 lb (66.7 kg)   SpO2 98%   BMI 26.89 kg/m²   Physical Exam  Vitals signs and nursing note reviewed. Constitutional:       Appearance: Normal appearance. She is not ill-appearing.    Cardiovascular: Rate and Rhythm: Normal rate and regular rhythm. Heart sounds: Normal heart sounds. Pulmonary:      Effort: Pulmonary effort is normal.      Breath sounds: Normal breath sounds. No wheezing, rhonchi or rales. Abdominal:      General: Abdomen is flat. Bowel sounds are normal. There is no distension. Palpations: There is no mass. Tenderness: There is abdominal tenderness in the left upper quadrant. There is no guarding or rebound. Hernia: No hernia is present. Neurological:      Mental Status: She is alert. Assessment:       Diagnosis Orders   1. Left lower quadrant abdominal pain  CBC Auto Differential    Comprehensive Metabolic Panel    Alejandra Marshall MD, Gastroenterology, [place]    Iron and TIBC    Ferritin    traMADol (ULTRAM) 50 MG tablet   2. Type 2 diabetes mellitus with hyperglycemia, without long-term current use of insulin ([facility])  atorvastatin (LIPITOR) 40 MG tablet    lisinopril (PRINIVIL;ZESTRIL) 5 MG tablet   3. Hyperlipidemia, unspecified hyperlipidemia type  Lipid Panel    ALT    AST   4. Low hemoglobin  Iron and TIBC    Ferritin        Plan:    [facility] to check for occult blood  BW ordered  Tramadol for pain  Hold on restarting ASA and Iron  Restart Lipitor and Lisinopril    No follow-ups on file. Orders Placed This Encounter   Procedures    CBC Auto Differential     Standing Status:   Future     Standing Expiration Date:   1/5/2022    Comprehensive Metabolic Panel     Standing Status:   Future     Standing Expiration Date:   1/6/2022    Iron and TIBC     Standing Status:   Future     Standing Expiration Date:   1/5/2022     Order Specific Question:   Is Patient Fasting? Answer:   no     Order Specific Question:   No of Hours?      Answer:   no    Ferritin     Standing Status:   Future     Standing Expiration Date:   1/5/2022    Lipid Panel     Standing Status:   Future     Standing Expiration Date:   1/5/2022 Order Specific Question:   Is Patient Fasting?/# of Hours     Answer:   10-12 hours    ALT     Standing Status:   Future     Standing Expiration Date:   1/5/2022    AST     Standing Status:   Future     Standing Expiration Date:   1/5/2022   Lizette Reyna MD, Gastroenterology, [place]     Referral Priority:   Routine     Referral Type:   Eval and Treat     Referral Reason:   Specialty Services Required     Referred to Provider:   Jaspal Rich MD     Requested Specialty:   Gastroenterology     Number of Visits Requested:   1     Orders Placed This Encounter   Medications    atorvastatin (LIPITOR) 40 MG tablet     Sig: Take 1 tablet by mouth daily     Dispense:  30 tablet     Refill:  11    lisinopril (PRINIVIL;ZESTRIL) 5 MG tablet     Sig: lisinopril 10 mg tablet     Dispense:  30 tablet     Refill:  5    traMADol (ULTRAM) 50 MG tablet     Sig: Take 1 tablet by mouth every 6 hours as needed for Pain for up to 5 days. Intended supply: 5 days. Take lowest dose possible to manage pain     Dispense:  20 tablet     Refill:  0     Reduce doses taken as pain becomes manageable       Patient given educationalmaterials - see patient instructions. Discussed use, benefit, and side effectsof prescribed medications. All patient questions answered. Pt voiced understanding. Reviewed health maintenance. Instructed to continue current medications, diet andexercise. Patient agreed with treatment plan. Follow up as directed.      Electronicallysigned by Parvez Berg PA-C on 1/5/2021 at 9:44 AM

## 2021-01-05 NOTE — TELEPHONE ENCOUNTER
Please call or resend script for lisinopril (PRINIVIL;ZESTRIL) with directions of use and strength. Bert Landrum at pharmacy states that one entry states 10mg, the other 5mg. Please clarify. [facility], [address] [place] [address].  Flora Regalado [phone] - F [phone]

## 2021-01-06 ENCOUNTER — TELEPHONE (OUTPATIENT)
Dept: GASTROENTEROLOGY | Age: 81
End: 2021-01-06

## 2021-01-13 LAB
ABSOLUTE BASO #: 0.1 X10E9/L (ref 0–0.2)
ABSOLUTE EOS #: 0.2 X10E9/L (ref 0–0.4)
ABSOLUTE LYMPH #: 1.6 X10E9/L (ref 1–3.5)
ABSOLUTE MONO #: 0.5 X10E9/L (ref 0–0.9)
ABSOLUTE NEUT #: 7.6 X10E9/L (ref 1.5–6.6)
ALBUMIN SERPL-MCNC: 3.8 G/DL (ref 3.2–5.3)
ALK PHOSPHATASE: 168 U/L (ref 39–130)
ALT SERPL-CCNC: 18 U/L (ref 0–31)
ANION GAP SERPL CALCULATED.3IONS-SCNC: 10 MMOL/L (ref 5–15)
AST SERPL-CCNC: 22 U/L (ref 0–41)
BASOPHILS RELATIVE PERCENT: 0.8 %
BILIRUB SERPL-MCNC: 0.4 MG/DL (ref 0.3–1.2)
BUN BLDV-MCNC: 22 MG/DL (ref 5–27)
CALCIUM SERPL-MCNC: 8.7 MG/DL (ref 8.5–10.5)
CHLORIDE BLD-SCNC: 111 MMOL/L (ref 98–109)
CHOLESTEROL/HDL RATIO: 3.2 (ref 1–5)
CHOLESTEROL: 131 MG/DL (ref 150–200)
CO2: 22 MMOL/L (ref 22–32)
CREAT SERPL-MCNC: 1.46 MG/DL (ref 0.4–1)
EGFR AFRICAN AMERICAN: 42 ML/MIN/1.73SQ.M
EGFR IF NONAFRICAN AMERICAN: 34 ML/MIN/1.73SQ.M
EOSINOPHILS RELATIVE PERCENT: 1.9 %
FERRITIN: 61 NG/ML (ref 11–307)
GLUCOSE: 85 MG/DL (ref 65–99)
HCT VFR BLD CALC: 31.5 % (ref 35–47)
HDLC SERPL-MCNC: 41 MG/DL
HEMOGLOBIN: 10.8 G/DL (ref 11.7–15.5)
IRON SATURATION: 20 % SATURATION (ref 15–50)
IRON, SERUM: 67 UG/DL (ref 50–170)
LDL CHOLESTEROL CALCULATED: 66 MG/DL
LDL/HDL RATIO: 1.6
LYMPHOCYTE %: 16 %
MCH RBC QN AUTO: 30.5 PG (ref 27–34)
MCHC RBC AUTO-ENTMCNC: 34.3 G/DL (ref 32–36)
MCV RBC AUTO: 89 FL (ref 80–100)
MONOCYTES # BLD: 4.6 %
NEUTROPHILS RELATIVE PERCENT: 76.7 %
PDW BLD-RTO: 13.7 % (ref 11.5–15)
PLATELETS: 188 X10E9/L (ref 150–450)
PMV BLD AUTO: 10.1 FL (ref 7–12)
POTASSIUM SERPL-SCNC: 4.1 MMOL/L (ref 3.5–5)
RBC: 3.54 X10E12/L (ref 3.8–5.2)
SODIUM BLD-SCNC: 143 MMOL/L (ref 134–146)
TOTAL IRON BINDING CAPACITY: 335 UG/DL (ref 250–425)
TOTAL PROTEIN: 6.6 G/DL (ref 6–8)
TRIGL SERPL-MCNC: 120 MG/DL (ref 27–150)
VLDLC SERPL CALC-MCNC: 24 MG/DL (ref 0–30)
WBC: 9.9 X10E9/L (ref 4–11)

## 2021-01-15 DIAGNOSIS — R79.89 ELEVATED SERUM CREATININE: Primary | ICD-10-CM

## 2021-01-19 DIAGNOSIS — Z92.3 HISTORY OF RADIATION THERAPY: ICD-10-CM

## 2021-01-19 DIAGNOSIS — R10.9 LEFT FLANK PAIN: ICD-10-CM

## 2021-01-19 DIAGNOSIS — R10.32 LEFT LOWER QUADRANT ABDOMINAL PAIN: Primary | ICD-10-CM

## 2021-01-19 DIAGNOSIS — C77.9 MALIGNANT NEOPLASM METASTATIC TO LYMPH NODES, UNSPECIFIED LYMPH NODE REGION (HCC): ICD-10-CM

## 2021-01-22 ENCOUNTER — TELEPHONE (OUTPATIENT)
Dept: PRIMARY CARE CLINIC | Age: 81
End: 2021-01-22

## 2021-01-22 NOTE — TELEPHONE ENCOUNTER
Hung Jacobs in [facility] calling and states that the order for the pts BMP is dated for a later date and the pt and her daughter are there and insisting that this be done today due to a CT scan that is scheduled for next week.    Per Gavino Moran verbal gave to do BW today   Also faxed a new rx for this/scanned

## 2021-01-23 LAB
ANION GAP SERPL CALCULATED.3IONS-SCNC: 10 MMOL/L (ref 5–15)
BUN BLDV-MCNC: 32 MG/DL (ref 5–27)
CALCIUM SERPL-MCNC: 8.9 MG/DL (ref 8.5–10.5)
CHLORIDE BLD-SCNC: 110 MMOL/L (ref 98–109)
CO2: 23 MMOL/L (ref 22–32)
CREAT SERPL-MCNC: 1.35 MG/DL (ref 0.4–1)
EGFR AFRICAN AMERICAN: 46 ML/MIN/1.73SQ.M
EGFR IF NONAFRICAN AMERICAN: 38 ML/MIN/1.73SQ.M
GLUCOSE: 191 MG/DL (ref 65–99)
POTASSIUM SERPL-SCNC: 4.3 MMOL/L (ref 3.5–5)
SODIUM BLD-SCNC: 143 MMOL/L (ref 134–146)

## 2021-02-03 ENCOUNTER — TELEPHONE (OUTPATIENT)
Dept: PRIMARY CARE CLINIC | Age: 81
End: 2021-02-03

## 2021-02-03 NOTE — TELEPHONE ENCOUNTER
[facility] Initial Follow Up Call    Outreach made within 2 business days of discharge: Yes    Patient: Ezio Ferguson Patient : 1940   MRN: [id number]  Reason for Admission: No discharge information exists for this patient. Discharge Date:         Spoke with: Elsie     Discharge department/facility: [facility] Interactive Patient Contact:  Was patient able to fill all prescriptions: Yes  Was patient instructed to bring all medications to the follow-up visit: Yes  Is patient taking all medications as directed in the discharge summary?  Yes  Does patient understand their discharge instructions: Yes  Does patient have questions or concerns that need addressed prior to 7-14 day follow up office visit: no    Scheduled appointment with PCP within 7-14 days    Follow Up  Future Appointments   Date Time Provider Joe Cherry   2021 10:00 AM Rajani Stone PA-C [facility], [address], [address]

## 2021-03-04 ENCOUNTER — OFFICE VISIT (OUTPATIENT)
Dept: PRIMARY CARE CLINIC | Age: 81
End: 2021-03-04
Payer: COMMERCIAL

## 2021-03-04 VITALS
SYSTOLIC BLOOD PRESSURE: 142 MMHG | BODY MASS INDEX: 27.79 KG/M2 | DIASTOLIC BLOOD PRESSURE: 82 MMHG | TEMPERATURE: 96.7 F | HEIGHT: 62 IN | WEIGHT: 151 LBS | OXYGEN SATURATION: 98 % | HEART RATE: 99 BPM

## 2021-03-04 DIAGNOSIS — E11.65 TYPE 2 DIABETES MELLITUS WITH HYPERGLYCEMIA, WITHOUT LONG-TERM CURRENT USE OF INSULIN (HCC): ICD-10-CM

## 2021-03-04 DIAGNOSIS — R42 VERTIGO: ICD-10-CM

## 2021-03-04 DIAGNOSIS — I10 ESSENTIAL HYPERTENSION: ICD-10-CM

## 2021-03-04 DIAGNOSIS — R09.89 RIGHT CAROTID BRUIT: Primary | ICD-10-CM

## 2021-03-04 PROCEDURE — 99214 OFFICE O/P EST MOD 30 MIN: CPT | Performed by: PHYSICIAN ASSISTANT

## 2021-03-04 RX ORDER — INSULIN DETEMIR 100 [IU]/ML
INJECTION, SOLUTION SUBCUTANEOUS
Qty: 5 PEN | Refills: 3
Start: 2021-03-04 | End: 2022-04-26 | Stop reason: DRUGHIGH

## 2021-03-04 RX ORDER — LISINOPRIL 10 MG/1
10 TABLET ORAL DAILY
Qty: 30 TABLET | Refills: 5 | Status: SHIPPED
Start: 2021-03-04 | End: 2021-06-30 | Stop reason: DRUGHIGH

## 2021-03-04 SDOH — ECONOMIC STABILITY: TRANSPORTATION INSECURITY
IN THE PAST 12 MONTHS, HAS THE LACK OF TRANSPORTATION KEPT YOU FROM MEDICAL APPOINTMENTS OR FROM GETTING MEDICATIONS?: NO

## 2021-03-04 SDOH — ECONOMIC STABILITY: FOOD INSECURITY: WITHIN THE PAST 12 MONTHS, THE FOOD YOU BOUGHT JUST DIDN'T LAST AND YOU DIDN'T HAVE MONEY TO GET MORE.: NEVER TRUE

## 2021-03-04 SDOH — ECONOMIC STABILITY: TRANSPORTATION INSECURITY
IN THE PAST 12 MONTHS, HAS LACK OF TRANSPORTATION KEPT YOU FROM MEETINGS, WORK, OR FROM GETTING THINGS NEEDED FOR DAILY LIVING?: NO

## 2021-03-04 SDOH — ECONOMIC STABILITY: FOOD INSECURITY: WITHIN THE PAST 12 MONTHS, YOU WORRIED THAT YOUR FOOD WOULD RUN OUT BEFORE YOU GOT MONEY TO BUY MORE.: NEVER TRUE

## 2021-03-04 ASSESSMENT — ENCOUNTER SYMPTOMS
RESPIRATORY NEGATIVE: 1
GASTROINTESTINAL NEGATIVE: 1

## 2021-03-04 NOTE — PROGRESS NOTES
[facility] PRIMARY CARE  [address] [place] [id number]  Dept: Cinthya is a [de-identified] y.o. female who presents today for her medical conditions/complaintsas noted below. Chief Complaint   Patient presents with    Follow-Up from Hospital     patient was admitted to [facility] on 1/31/21 and CT on 2/2/21. Patient said she was admitted due to being dizzy and throwing up. Pt said she is feeling great       HPI:     HPI  Was diagnosed with vertigo and feels much better now. Vertigo with vomiting has happened 3-6 times a year for quite awhile     A1C=9.9 12/20. Patient decreased Levemir to 10U due to abdominal pain and ever since has had none. She does not want to go back on the insulin. Too early for A1C today. Lisinopril was reduced to 5 mg somehow. Should be 10mg   Never took Tramadol due to hallucinations.     She states \"someone told me to stop ASA\"   LDL Calculated (mg/dL)   Date Value   01/12/2021 66   07/10/2020 141       (goal LDL is <100)   AST (U/L)   Date Value   01/12/2021 22     ALT (U/L)   Date Value   01/12/2021 18     BUN (mg/dL)   Date Value   01/22/2021 32 (H)     BP Readings from Last 3 Encounters:   03/04/21 (!) 142/82   01/05/21 126/78   12/15/20 128/80          (goal 120/80)    Past Medical History:   Diagnosis Date    Cancer ([facility] [address]     breast cancer    Hypertension     Ischemic optic neuropathy     Type 2 diabetes mellitus without complication ([facility] [address]       Past Surgical History:   Procedure Laterality Date    BACK SURGERY      BREAST SURGERY      lumpectomy left    HYSTERECTOMY, VAGINAL         Family History   Problem Relation Age of Onset    Heart Attack Father     Diabetes type 2  Daughter        Social History     Tobacco Use    Smoking status: Former Smoker     Packs/day: 0.50     Years: 10.00     Pack years: 5.00     Quit date: 1/1/2018     Years since quitting: 3.1    Smokeless tobacco: Never Used   Substance Use Topics    Alcohol use: Not Currently      Current Outpatient Medications   Medication Sig Dispense Refill    insulin detemir (LEVEMIR FLEXTOUCH) 100 UNIT/ML injection pen Take 10U at night 5 pen 3    lisinopril (PRINIVIL;ZESTRIL) 10 MG tablet Take 1 tablet by mouth daily 30 tablet 5    atorvastatin (LIPITOR) 40 MG tablet Take 1 tablet by mouth daily 30 tablet 11    TRADJENTA 5 MG tablet 1 tablet daily 30 tablet 0    glimepiride (AMARYL) 2 MG tablet 2 tablets every 12 hours 30 tablet 0    ferrous sulfate (FE TABS) 325 (65 Fe) MG EC tablet Take 1 tablet by mouth 2 times daily (Patient not taking: Reported on 12/15/2020) 90 tablet 3     No current facility-administered medications for this visit. No Known Allergies    Health Maintenance   Topic Date Due    COVID-19 Vaccine (1 of 2) Never done    DTaP/Tdap/Td vaccine (1 - Tdap) Never done    Shingles Vaccine (1 of 2) Never done    DEXA (modify frequency per FRAX score)  Never done   [employer] Visit (AWV)  Never done    Lipid screen  01/12/2022    Potassium monitoring  01/22/2022    Creatinine monitoring  01/22/2022    Flu vaccine  Completed    Pneumococcal 65+ yrs at Risk Vaccine  Completed    Hepatitis A vaccine  Aged Out    Hib vaccine  Aged Out    Meningococcal (ACWY) vaccine  Aged Out       Subjective:      Review of Systems   Constitutional: Negative for chills, diaphoresis, fatigue and fever. Respiratory: Negative. Cardiovascular: Negative. Gastrointestinal: Negative. Genitourinary: Negative. Musculoskeletal: Negative. Neurological: Negative. Objective:     BP (!) 142/82   Pulse 99   Temp 96.7 °F (35.9 °C)   Ht 5' 2\" (1.575 m)   Wt 151 lb (68.5 kg)   SpO2 98%   BMI 27.62 kg/m²   Physical Exam  Vitals signs and nursing note reviewed. Constitutional:       Appearance: Normal appearance. Neck:      Musculoskeletal: Normal range of motion. Vascular: Carotid bruit (right) present. Cardiovascular:      Rate and Rhythm: Normal rate and regular rhythm. Heart sounds: Normal heart sounds. Neurological:      Mental Status: She is alert and oriented to person, place, and time. Psychiatric:         Mood and Affect: Mood normal.         Assessment:       Diagnosis Orders   1. Right carotid bruit  VL DUP CAROTID BILATERAL   2. Type 2 diabetes mellitus with hyperglycemia, without long-term current use of insulin (HCC)  insulin detemir (LEVEMIR FLEXTOUCH) 100 UNIT/ML injection pen    lisinopril (PRINIVIL;ZESTRIL) 10 MG tablet   3. Vertigo  VL DUP CAROTID BILATERAL   4. Essential hypertension          Plan:    Restart baby ASA daily   Recheck BP  Go back to Lisinopril 10 mg 1 po qd  Suggested PT for vestibular rehab--she wants to wait on this because she has hard time getting a ride  Given exercise for vertigo to do when Amy Dredge is home with her. Return in about 1 month (around 4/4/2021) for Diabetes. Orders Placed This Encounter   Procedures    VL DUP CAROTID BILATERAL     Standing Status:   Future     Standing Expiration Date:   3/4/2022     Orders Placed This Encounter   Medications    insulin detemir (LEVEMIR FLEXTOUCH) 100 UNIT/ML injection pen     Sig: Take 10U at night     Dispense:  5 pen     Refill:  3    lisinopril (PRINIVIL;ZESTRIL) 10 MG tablet     Sig: Take 1 tablet by mouth daily     Dispense:  30 tablet     Refill:  5       Patient given educationalmaterials - see patient instructions. Discussed use, benefit, and side effectsof prescribed medications. All patient questions answered. Pt voiced understanding. Reviewed health maintenance. Instructed to continue current medications, diet andexercise. Patient agreed with treatment plan. Follow up as directed.      Electronicallysigned by Parvez Berg PA-C on 3/4/2021 at 1:33 PM

## 2021-03-04 NOTE — PATIENT INSTRUCTIONS
Patient Education        Vertigo: Exercises  Introduction  Here are some examples of exercises for you to try. The exercises may be suggested for a condition or for rehabilitation. Start each exercise slowly. Ease off the exercises if you start to have pain. You will be told when to start these exercises and which ones will work best for you. How to do the exercises  Exercise 1   1. Stand with a chair in front of you and a wall behind you. If you begin to fall, you may use them for support. 2. Stand with your feet together and your arms at your sides. 3. Move your head up and down 10 times. Exercise 2   1. Move your head side to side 10 times. Exercise 3   1. Move your head diagonally up and down 10 times. Exercise 4   1. Move your head diagonally up and down 10 times on the other side. Follow-up care is a key part of your treatment and safety. Be sure to make and go to all appointments, and call your doctor if you are having problems. It's also a good idea to know your test results and keep a list of the medicines you take. Where can you learn more? Go to [email or web address]://[email or web address]. org and sign in to your [employer] account. Enter F349 in the Xytis box to learn more about \"Vertigo: Exercises. \"     If you do not have an account, please click on the \"Sign Up Now\" link. Current as of: April 15, 2020               Content Version: 12.6  © 2389-2430 [employer]. Care instructions adapted under license by [facility] ([facility]). If you have questions about a medical condition or this instruction, always ask your healthcare professional. [address] any warranty or liability for your use of this information.

## 2021-03-10 ENCOUNTER — TELEPHONE (OUTPATIENT)
Dept: PRIMARY CARE CLINIC | Age: 81
End: 2021-03-10

## 2021-04-07 DIAGNOSIS — R42 VERTIGO: ICD-10-CM

## 2021-04-07 DIAGNOSIS — R09.89 RIGHT CAROTID BRUIT: ICD-10-CM

## 2021-04-09 DIAGNOSIS — I99.8 VASCULAR INSUFFICIENCY: ICD-10-CM

## 2021-04-09 DIAGNOSIS — I65.23 BILATERAL CAROTID ARTERY STENOSIS: Primary | ICD-10-CM

## 2021-04-09 DIAGNOSIS — R01.1 MURMUR, CARDIAC: ICD-10-CM

## 2021-04-29 DIAGNOSIS — I65.23 BILATERAL CAROTID ARTERY STENOSIS: ICD-10-CM

## 2021-04-29 DIAGNOSIS — R01.1 MURMUR, CARDIAC: ICD-10-CM

## 2021-04-29 DIAGNOSIS — I99.8 VASCULAR INSUFFICIENCY: ICD-10-CM

## 2021-05-04 ENCOUNTER — OFFICE VISIT (OUTPATIENT)
Dept: PRIMARY CARE CLINIC | Age: 81
End: 2021-05-04
Payer: COMMERCIAL

## 2021-05-04 VITALS
SYSTOLIC BLOOD PRESSURE: 148 MMHG | WEIGHT: 151 LBS | HEART RATE: 99 BPM | BODY MASS INDEX: 27.62 KG/M2 | DIASTOLIC BLOOD PRESSURE: 88 MMHG | OXYGEN SATURATION: 98 %

## 2021-05-04 DIAGNOSIS — R79.89 ELEVATED SERUM CREATININE: ICD-10-CM

## 2021-05-04 DIAGNOSIS — R10.9 LEFT FLANK PAIN: ICD-10-CM

## 2021-05-04 DIAGNOSIS — K86.1 CHRONIC PANCREATITIS, UNSPECIFIED PANCREATITIS TYPE (HCC): ICD-10-CM

## 2021-05-04 DIAGNOSIS — K83.8 COMMON BILE DUCT DILATATION: ICD-10-CM

## 2021-05-04 DIAGNOSIS — R10.32 LEFT LOWER QUADRANT ABDOMINAL PAIN: Primary | ICD-10-CM

## 2021-05-04 PROBLEM — R42 VERTIGO: Status: ACTIVE | Noted: 2021-01-31

## 2021-05-04 PROCEDURE — 99214 OFFICE O/P EST MOD 30 MIN: CPT | Performed by: PHYSICIAN ASSISTANT

## 2021-05-04 RX ORDER — METRONIDAZOLE 250 MG/1
250 TABLET ORAL 3 TIMES DAILY
Qty: 21 TABLET | Refills: 0 | Status: SHIPPED | OUTPATIENT
Start: 2021-05-04 | End: 2021-06-04 | Stop reason: SDUPTHER

## 2021-05-04 RX ORDER — ACETAMINOPHEN AND CODEINE PHOSPHATE 300; 30 MG/1; MG/1
1 TABLET ORAL EVERY 4 HOURS PRN
Qty: 30 TABLET | Refills: 0 | Status: SHIPPED | OUTPATIENT
Start: 2021-05-04 | End: 2021-05-09

## 2021-05-04 ASSESSMENT — PATIENT HEALTH QUESTIONNAIRE - PHQ9
1. LITTLE INTEREST OR PLEASURE IN DOING THINGS: 0
SUM OF ALL RESPONSES TO PHQ QUESTIONS 1-9: 0

## 2021-05-04 ASSESSMENT — ENCOUNTER SYMPTOMS
ABDOMINAL PAIN: 1
RESPIRATORY NEGATIVE: 1

## 2021-05-04 NOTE — PROGRESS NOTES
[address] [place] [id number]  Dept: Cinthya is a [de-identified] y.o. female who presents today for her medical conditions/complaints as noted below. Chief Complaint   Patient presents with    Other     Pt here to discuss her echo    Abdominal Pain     left lower abdominal pain x2-3 months- not as severe as it was       HPI:     HPI  Echo is normal. Vertigo is gone    Abdominal pain: Started 2-3 months ago on left lower side and radiated around to stomach. CT of abdomen showed chronic pancreatitis and dilated biliary ducts.   Also diverticulosis     LDL Calculated (mg/dL)   Date Value   01/12/2021 66   07/10/2020 141       (goal LDL is <100)   AST (U/L)   Date Value   01/12/2021 22     ALT (U/L)   Date Value   01/12/2021 18     BUN (mg/dL)   Date Value   01/22/2021 32 (H)     BP Readings from Last 3 Encounters:   05/04/21 (!) 148/82   03/04/21 (!) 142/82   01/05/21 126/78          (goal 120/80)    Past Medical History:   Diagnosis Date    Cancer ([facility] [address]     breast cancer    Hypertension     Ischemic optic neuropathy     Type 2 diabetes mellitus without complication ([facility] [address]       Past Surgical History:   Procedure Laterality Date    BACK SURGERY      BREAST SURGERY      lumpectomy left    HYSTERECTOMY, VAGINAL         Family History   Problem Relation Age of Onset    Heart Attack Father     Diabetes type 2  Daughter        Social History     Tobacco Use    Smoking status: Former Smoker     Packs/day: 0.50     Years: 10.00     Pack years: 5.00     Quit date: 1/1/2018     Years since quitting: 3.3    Smokeless tobacco: Never Used   Substance Use Topics    Alcohol use: Not Currently      Current Outpatient Medications   Medication Sig Dispense Refill    metroNIDAZOLE (FLAGYL) 250 MG tablet Take 1 tablet by mouth 3 times daily 21 tablet 0    acetaminophen-codeine (TYLENOL/CODEINE #3) 300-30 MG per tablet Take 1 tablet normal. There is no distension. Palpations: Abdomen is soft. There is no mass. Tenderness: There is abdominal tenderness (LLQ) in the left lower quadrant. There is rebound. There is no guarding. Hernia: A hernia is present. Skin:     Findings: No rash. Neurological:      Mental Status: She is alert and oriented to person, place, and time. Psychiatric:         Mood and Affect: Mood normal.         Assessment:       Diagnosis Orders   1. Left lower quadrant abdominal pain  Radha Gant MD, Gastroenterology, [place]    metroNIDAZOLE (FLAGYL) 250 MG tablet    acetaminophen-codeine (TYLENOL/CODEINE #3) 300-30 MG per tablet   2. Left flank pain  Radha Gant MD, Gastroenterology, [place]   3. Elevated serum creatinine  Comprehensive Metabolic Panel, Fasting   4. Chronic pancreatitis, unspecified pancreatitis type ([facility] [address]  Amylase    Awilda Blackman MD, Gastroenterology, [place]   5. Common bile duct dilatation  Radha Gant MD, Gastroenterology, [place]        Plan:    REcheck BP  BW ordered   Reviewed CT from February in [address] [place] [address]  Will treat as diverticulosis   T3 for pain  Return in about 1 month (around 6/4/2021) for recheck--1/2 hour.     Orders Placed This Encounter   Procedures    Comprehensive Metabolic Panel, Fasting     Standing Status:   Future     Standing Expiration Date:   5/4/2022    Amylase     Standing Status:   Future     Standing Expiration Date:   5/4/2022    Lipase     Standing Status:   Future     Standing Expiration Date:   Hima Hatch MD, Gastroenterology, [place]     Referral Priority:   Routine     Referral Type:   Eval and Treat     Referral Reason:   Specialty Services Required     Referred to Provider:   Roddy Babcock MD     Requested Specialty:   Gastroenterology     Number of Visits Requested:   1     Orders Placed This Encounter   Medications    metroNIDAZOLE (FLAGYL) 250 MG tablet     Sig: Take 1 tablet by mouth 3 times daily     Dispense:  21 tablet     Refill:  0    acetaminophen-codeine (TYLENOL/CODEINE #3) 300-30 MG per tablet     Sig: Take 1 tablet by mouth every 4 hours as needed for Pain for up to 5 days. Intended supply: 5 days. Take lowest dose possible to manage pain     Dispense:  30 tablet     Refill:  0     Reduce doses taken as pain becomes manageable       Patient given educational materials - see patient instructions. Discussed use, benefit, and side effects of prescribed medications. All patient questions answered. Pt voiced understanding. Reviewed health maintenance. Instructed to continue current medications, diet and exercise. Patient agreed with treatment plan. Follow up as directed.      Electronically signed by Stephanie Charles PA-C on 5/4/2021 at 8:38 AM

## 2021-05-06 LAB
ALBUMIN SERPL-MCNC: 3.8 G/DL (ref 3.2–5.3)
ALK PHOSPHATASE: 232 U/L (ref 39–130)
ALT SERPL-CCNC: 169 U/L (ref 0–31)
AMYLASE: 33 U/L (ref 28–100)
ANION GAP SERPL CALCULATED.3IONS-SCNC: 9 MMOL/L (ref 5–15)
AST SERPL-CCNC: 261 U/L (ref 0–41)
BILIRUB SERPL-MCNC: 0.9 MG/DL (ref 0.3–1.2)
BUN BLDV-MCNC: 38 MG/DL (ref 5–27)
CALCIUM SERPL-MCNC: 8.9 MG/DL (ref 8.5–10.5)
CHLORIDE BLD-SCNC: 111 MMOL/L (ref 98–109)
CO2: 23 MMOL/L (ref 22–32)
CREAT SERPL-MCNC: 1.49 MG/DL (ref 0.4–1)
EGFR AFRICAN AMERICAN: 41 ML/MIN/1.73SQ.M
EGFR IF NONAFRICAN AMERICAN: 34 ML/MIN/1.73SQ.M
GLUCOSE: 164 MG/DL (ref 65–99)
LIPASE: 38 U/L (ref 11–82)
POTASSIUM SERPL-SCNC: 4.6 MMOL/L (ref 3.5–5)
SODIUM BLD-SCNC: 143 MMOL/L (ref 134–146)
TOTAL PROTEIN: 6.7 G/DL (ref 6–8)

## 2021-05-27 ENCOUNTER — TELEPHONE (OUTPATIENT)
Dept: PRIMARY CARE CLINIC | Age: 81
End: 2021-05-27

## 2021-05-27 NOTE — TELEPHONE ENCOUNTER
[facility] Initial Follow Up Call    Outreach made within 2 business days of discharge: Yes    Patient: Nanette Boyd Patient : 1940   MRN: [id number]  Reason for Admission: No discharge information exists for this patient.   Discharge Date:         Spoke with: No answer- left a VM to call the office back    Discharge department/facility: [facility]        Scheduled appointment with PCP within 7-14 days    Follow Up  Future Appointments   Date Time Provider Joe Cherry   2021 10:45 AM JITENDRA Doan [place], [place]

## 2021-05-28 ENCOUNTER — TELEPHONE (OUTPATIENT)
Dept: PRIMARY CARE CLINIC | Age: 81
End: 2021-05-28

## 2021-05-28 NOTE — TELEPHONE ENCOUNTER
[facility] Initial Follow Up Call    Outreach made within 2 business days of discharge: Yes    Patient: Paris Pineda Patient : 1940   MRN: [id number]  Reason for Admission: No discharge information exists for this patient. Discharge Date:         Spoke with: Elsie    Discharge department/facility: [facility] Interactive Patient Contact:  Was patient able to fill all prescriptions: Yes  Was patient instructed to bring all medications to the follow-up visit: Yes  Is patient taking all medications as directed in the discharge summary?  Yes  Does patient understand their discharge instructions: Yes  Does patient have questions or concerns that need addressed prior to 7-14 day follow up office visit: no    Scheduled appointment with PCP within 7-14 days    Follow Up  Future Appointments   Date Time Provider Joe Cherry   2021 10:45 AM JITENDRA Lopez [place], [place]

## 2021-06-04 ENCOUNTER — OFFICE VISIT (OUTPATIENT)
Dept: PRIMARY CARE CLINIC | Age: 81
End: 2021-06-04
Payer: COMMERCIAL

## 2021-06-04 VITALS
BODY MASS INDEX: 27.42 KG/M2 | DIASTOLIC BLOOD PRESSURE: 80 MMHG | WEIGHT: 149 LBS | HEART RATE: 95 BPM | SYSTOLIC BLOOD PRESSURE: 126 MMHG | OXYGEN SATURATION: 98 % | HEIGHT: 62 IN

## 2021-06-04 DIAGNOSIS — R10.32 LEFT LOWER QUADRANT ABDOMINAL PAIN: ICD-10-CM

## 2021-06-04 DIAGNOSIS — R10.33 PERIUMBILICAL PAIN: ICD-10-CM

## 2021-06-04 DIAGNOSIS — K86.89 PANCREATIC ATROPHY: ICD-10-CM

## 2021-06-04 DIAGNOSIS — E11.65 TYPE 2 DIABETES MELLITUS WITH HYPERGLYCEMIA, WITHOUT LONG-TERM CURRENT USE OF INSULIN (HCC): ICD-10-CM

## 2021-06-04 DIAGNOSIS — R79.89 ELEVATED SERUM CREATININE: ICD-10-CM

## 2021-06-04 DIAGNOSIS — K83.8 DILATED BILE DUCT: ICD-10-CM

## 2021-06-04 DIAGNOSIS — K57.92 DIVERTICULITIS: Primary | ICD-10-CM

## 2021-06-04 DIAGNOSIS — D64.9 LOW HEMOGLOBIN: ICD-10-CM

## 2021-06-04 LAB
A/G RATIO: NORMAL
ALBUMIN SERPL-MCNC: NORMAL G/DL
ALP BLD-CCNC: NORMAL U/L
ALT SERPL-CCNC: NORMAL U/L
AMYLASE: NORMAL
AST SERPL-CCNC: NORMAL U/L
AVERAGE GLUCOSE: 171
BASOPHILS ABSOLUTE: NORMAL
BASOPHILS RELATIVE PERCENT: NORMAL
BILIRUB SERPL-MCNC: NORMAL MG/DL
BILIRUBIN DIRECT: NORMAL
BILIRUBIN, INDIRECT: NORMAL
EOSINOPHILS ABSOLUTE: NORMAL
EOSINOPHILS RELATIVE PERCENT: NORMAL
FERRITIN: NORMAL
GLOBULIN: NORMAL
HBA1C MFR BLD: 7.6 %
HCT VFR BLD CALC: NORMAL %
HEMOGLOBIN: NORMAL
IRON: NORMAL
LIPASE: NORMAL
LYMPHOCYTES ABSOLUTE: NORMAL
LYMPHOCYTES RELATIVE PERCENT: NORMAL
MCH RBC QN AUTO: NORMAL PG
MCHC RBC AUTO-ENTMCNC: NORMAL G/DL
MCV RBC AUTO: NORMAL FL
MONOCYTES ABSOLUTE: NORMAL
MONOCYTES RELATIVE PERCENT: NORMAL
NEUTROPHILS ABSOLUTE: NORMAL
NEUTROPHILS RELATIVE PERCENT: NORMAL
PDW BLD-RTO: NORMAL %
PLATELET # BLD: NORMAL 10*3/UL
PMV BLD AUTO: NORMAL FL
PROTEIN TOTAL: NORMAL
RBC # BLD: NORMAL 10*6/UL
TOTAL IRON BINDING CAPACITY: NORMAL
WBC # BLD: NORMAL 10*3/UL

## 2021-06-04 PROCEDURE — 99495 TRANSJ CARE MGMT MOD F2F 14D: CPT | Performed by: PHYSICIAN ASSISTANT

## 2021-06-04 RX ORDER — METRONIDAZOLE 250 MG/1
250 TABLET ORAL 3 TIMES DAILY
Qty: 30 TABLET | Refills: 0 | Status: SHIPPED | OUTPATIENT
Start: 2021-06-04 | End: 2021-06-14

## 2021-06-04 ASSESSMENT — ENCOUNTER SYMPTOMS
ABDOMINAL PAIN: 1
DIARRHEA: 0
NAUSEA: 0
BLOOD IN STOOL: 0
VOMITING: 0
BACK PAIN: 0
SHORTNESS OF BREATH: 0

## 2021-06-04 NOTE — PROGRESS NOTES
[facility] PRIMARY CARE  [address] [place] [id number]  Dept: Cinthya is a [de-identified] y.o. female who presents today for her medical conditions/complaints as noted below. Chief Complaint   Patient presents with    Follow-Up from Hospital     patient was admitted to [facility] from 5/23-5/27 due to diverticulitits. HPI:     HPI  Was in the hospital 5/23/21 for diverticulitis until 5/27/2. N/V and dizziness were her presenting symptoms.  Could only take the Cipro  and Flaygl one day due to \"getting sick\"     Hemoglobin was low in hospital--last one 8.7 on 5-26-21  Cr elevated --last one 1.56 on 5-26-21  Sugars ran high in hospital and last A1C in 12/20 was 9.9  LDL Calculated (mg/dL)   Date Value   01/12/2021 66   07/10/2020 141       (goal LDL is <100)   AST (U/L)   Date Value   05/06/2021 261 (H)     ALT (U/L)   Date Value   05/06/2021 169 (H)     BUN (mg/dL)   Date Value   05/06/2021 38 (H)     BP Readings from Last 3 Encounters:   06/04/21 126/80   05/04/21 (!) 148/88   03/04/21 (!) 142/82          (goal 120/80)    Past Medical History:   Diagnosis Date    Cancer ([facility] [address]     breast cancer    Hypertension     Ischemic optic neuropathy     Type 2 diabetes mellitus without complication ([facility] [address]       Past Surgical History:   Procedure Laterality Date    BACK SURGERY      BREAST SURGERY      lumpectomy left    HYSTERECTOMY, VAGINAL         Family History   Problem Relation Age of Onset    Heart Attack Father     Diabetes type 2  Daughter        Social History     Tobacco Use    Smoking status: Former Smoker     Packs/day: 0.50     Years: 10.00     Pack years: 5.00     Quit date: 1/1/2018     Years since quitting: 3.4    Smokeless tobacco: Never Used   Substance Use Topics    Alcohol use: Not Currently      Current Outpatient Medications   Medication Sig Dispense Refill    insulin detemir (LEVEMIR FLEXTOUCH) 100 UNIT/ML injection pen Take 10U at night 5 pen 3    lisinopril (PRINIVIL;ZESTRIL) 10 MG tablet Take 1 tablet by mouth daily 30 tablet 5    atorvastatin (LIPITOR) 40 MG tablet Take 1 tablet by mouth daily 30 tablet 11    TRADJENTA 5 MG tablet 1 tablet daily 30 tablet 0    glimepiride (AMARYL) 2 MG tablet 2 tablets every 12 hours 30 tablet 0    metroNIDAZOLE (FLAGYL) 250 MG tablet Take 1 tablet by mouth 3 times daily (Patient not taking: Reported on 6/4/2021) 21 tablet 0     No current facility-administered medications for this visit. No Known Allergies    Health Maintenance   Topic Date Due    COVID-19 Vaccine (1) Never done    DTaP/Tdap/Td vaccine (1 - Tdap) Never done    Shingles Vaccine (1 of 2) Never done    DEXA (modify frequency per FRAX score)  Never done   [employer] Visit (AWV)  Never done    Lipid screen  01/12/2022    Potassium monitoring  05/06/2022    Creatinine monitoring  05/06/2022    Flu vaccine  Completed    Pneumococcal 65+ yrs at Risk Vaccine  Completed    Hepatitis A vaccine  Aged Out    Hib vaccine  Aged Out    Meningococcal (ACWY) vaccine  Aged Out       Subjective:      Review of Systems   Constitutional: Negative for appetite change, chills, diaphoresis, fever and unexpected weight change. Respiratory: Negative for shortness of breath. Cardiovascular: Negative for chest pain. Gastrointestinal: Positive for abdominal pain. Negative for blood in stool, diarrhea, nausea and vomiting. Musculoskeletal: Negative for back pain. Skin: Negative for rash. Neurological: Negative for dizziness and syncope. Objective:     /80   Pulse 95   Ht 5' 2\" (1.575 m)   Wt 149 lb (67.6 kg)   SpO2 98%   BMI 27.25 kg/m²   Physical Exam  Vitals and nursing note reviewed. Constitutional:       Appearance: Normal appearance. Cardiovascular:      Rate and Rhythm: Normal rate and regular rhythm. Heart sounds: Normal heart sounds.    Pulmonary:      Effort: Pulmonary effort is

## 2021-06-16 ENCOUNTER — TELEPHONE (OUTPATIENT)
Dept: GASTROENTEROLOGY | Age: 81
End: 2021-06-16

## 2021-06-24 ENCOUNTER — TELEPHONE (OUTPATIENT)
Dept: PRIMARY CARE CLINIC | Age: 81
End: 2021-06-24

## 2021-06-25 DIAGNOSIS — K57.92 DIVERTICULITIS: ICD-10-CM

## 2021-06-25 DIAGNOSIS — D64.9 LOW HEMOGLOBIN: ICD-10-CM

## 2021-06-25 DIAGNOSIS — E11.65 TYPE 2 DIABETES MELLITUS WITH HYPERGLYCEMIA, WITHOUT LONG-TERM CURRENT USE OF INSULIN (HCC): ICD-10-CM

## 2021-06-25 DIAGNOSIS — K83.8 DILATED BILE DUCT: ICD-10-CM

## 2021-07-15 ENCOUNTER — TELEPHONE (OUTPATIENT)
Dept: PRIMARY CARE CLINIC | Age: 81
End: 2021-07-15

## 2021-07-21 ENCOUNTER — TELEPHONE (OUTPATIENT)
Dept: PRIMARY CARE CLINIC | Age: 81
End: 2021-07-21

## 2021-07-21 NOTE — TELEPHONE ENCOUNTER
Dr. Sunita Hawk office called to state they have tried several times to reach pt, but no response.      FYI- advise if needed

## 2021-08-10 ENCOUNTER — OFFICE VISIT (OUTPATIENT)
Dept: PRIMARY CARE CLINIC | Age: 81
End: 2021-08-10
Payer: COMMERCIAL

## 2021-08-10 VITALS
BODY MASS INDEX: 27.05 KG/M2 | WEIGHT: 147 LBS | HEART RATE: 90 BPM | DIASTOLIC BLOOD PRESSURE: 80 MMHG | HEIGHT: 62 IN | SYSTOLIC BLOOD PRESSURE: 132 MMHG | OXYGEN SATURATION: 98 %

## 2021-08-10 DIAGNOSIS — Z78.0 MENOPAUSE: ICD-10-CM

## 2021-08-10 DIAGNOSIS — F51.02 ADJUSTMENT INSOMNIA: ICD-10-CM

## 2021-08-10 DIAGNOSIS — J44.1 CHRONIC OBSTRUCTIVE PULMONARY DISEASE WITH (ACUTE) EXACERBATION (HCC): ICD-10-CM

## 2021-08-10 DIAGNOSIS — I11.0 HYPERTENSIVE HEART DISEASE WITH HEART FAILURE (HCC): ICD-10-CM

## 2021-08-10 DIAGNOSIS — Z78.0 ASYMPTOMATIC MENOPAUSAL STATE: ICD-10-CM

## 2021-08-10 DIAGNOSIS — Z00.00 ROUTINE GENERAL MEDICAL EXAMINATION AT A HEALTH CARE FACILITY: Primary | ICD-10-CM

## 2021-08-10 PROBLEM — E11.65 TYPE 2 DIABETES MELLITUS WITH HYPERGLYCEMIA (HCC): Status: ACTIVE | Noted: 2021-06-28

## 2021-08-10 PROBLEM — M19.90 UNSPECIFIED OSTEOARTHRITIS, UNSPECIFIED SITE: Status: ACTIVE | Noted: 2021-06-28

## 2021-08-10 PROBLEM — K59.00 CONSTIPATION, UNSPECIFIED: Status: ACTIVE | Noted: 2021-06-28

## 2021-08-10 PROBLEM — Z85.42 PERSONAL HISTORY OF MALIGNANT NEOPLASM OF OTHER PARTS OF UTERUS: Status: ACTIVE | Noted: 2021-06-28

## 2021-08-10 PROBLEM — Z85.3 PERSONAL HISTORY OF MALIGNANT NEOPLASM OF BREAST: Status: ACTIVE | Noted: 2021-06-28

## 2021-08-10 PROCEDURE — G0438 PPPS, INITIAL VISIT: HCPCS | Performed by: PHYSICIAN ASSISTANT

## 2021-08-10 RX ORDER — DOCUSATE SODIUM 100 MG/1
100 CAPSULE, LIQUID FILLED ORAL DAILY PRN
Qty: 30 CAPSULE | Refills: 2 | Status: SHIPPED | OUTPATIENT
Start: 2021-08-10 | End: 2021-09-15

## 2021-08-10 RX ORDER — TRAZODONE HYDROCHLORIDE 50 MG/1
50 TABLET ORAL NIGHTLY
Qty: 30 TABLET | Refills: 5 | Status: SHIPPED | OUTPATIENT
Start: 2021-08-10 | End: 2022-04-26 | Stop reason: SDUPTHER

## 2021-08-10 RX ORDER — MECLIZINE HYDROCHLORIDE 25 MG/1
25 TABLET ORAL 3 TIMES DAILY PRN
Qty: 30 TABLET | Refills: 3 | Status: SHIPPED | OUTPATIENT
Start: 2021-08-10 | End: 2021-10-27

## 2021-08-10 RX ORDER — ASPIRIN 81 MG/1
81 TABLET ORAL DAILY
Qty: 90 TABLET | Refills: 1
Start: 2021-08-10

## 2021-08-10 RX ORDER — DOCUSATE SODIUM 100 MG/1
100 CAPSULE, LIQUID FILLED ORAL DAILY PRN
COMMUNITY
End: 2021-08-10 | Stop reason: SDUPTHER

## 2021-08-10 RX ORDER — MECLIZINE HYDROCHLORIDE 25 MG/1
25 TABLET ORAL 3 TIMES DAILY PRN
COMMUNITY
End: 2021-08-10 | Stop reason: SDUPTHER

## 2021-08-10 ASSESSMENT — PATIENT HEALTH QUESTIONNAIRE - PHQ9
1. LITTLE INTEREST OR PLEASURE IN DOING THINGS: 0
SUM OF ALL RESPONSES TO PHQ QUESTIONS 1-9: 0
2. FEELING DOWN, DEPRESSED OR HOPELESS: 0
SUM OF ALL RESPONSES TO PHQ QUESTIONS 1-9: 0
SUM OF ALL RESPONSES TO PHQ QUESTIONS 1-9: 0
SUM OF ALL RESPONSES TO PHQ9 QUESTIONS 1 & 2: 0

## 2021-08-10 NOTE — PROGRESS NOTES
Medicare Annual Wellness Visit  Name: Gal Santa Date: 8/10/2021   MRN: [id number] Sex: Female   Age: [de-identified] y.o. Ethnicity: Non- / Non    : 1940 Race: White (non-)      Denis Gary is here for Medicare AWV    Screenings for behavioral, psychosocial and functional/safety risks, and cognitive dysfunction are all negative except as indicated below. These results, as well as other patient data from the [address] form, are documented in Flowsheets linked to this Encounter. No Known Allergies      Prior to Visit Medications    Medication Sig Taking?  Authorizing Provider   docusate sodium (COLACE) 100 MG capsule Take 1 capsule by mouth daily as needed for Constipation Yes Moira Perez PA-C   meclizine (ANTIVERT) 25 MG tablet Take 1 tablet by mouth 3 times daily as needed for Dizziness Yes Moira Perez PA-C   aspirin EC 81 MG EC tablet Take 1 tablet by mouth daily Yes Moira Perez PA-C   traZODone (DESYREL) 50 MG tablet Take 1 tablet by mouth nightly Yes Moira Perez PA-C   lisinopril (PRINIVIL;ZESTRIL) 5 MG tablet TAKE 1 TABLET BY MOUTH DAILY Yes Moira Perez PA-C   insulin detemir (LEVEMIR FLEXTOUCH) 100 UNIT/ML injection pen Take 10U at night Yes Moira Perez PA-C   atorvastatin (LIPITOR) 40 MG tablet Take 1 tablet by mouth daily Yes Moira Perez PA-C   TRADJENTA 5 MG tablet 1 tablet daily Yes Moira Perez PA-C   glimepiride (AMARYL) 2 MG tablet 2 tablets every 12 hours Yes Moira ePrez PA-C   metoprolol tartrate (LOPRESSOR) 25 MG tablet   Historical Provider, MD         Past Medical History:   Diagnosis Date    Cancer [facility])     breast cancer    Hypertension     Ischemic optic neuropathy     Type 2 diabetes mellitus without complication ([facility] [address]        Past Surgical History:   Procedure Laterality Date    BACK SURGERY      BREAST SURGERY      lumpectomy left    HYSTERECTOMY, VAGINAL Family History   Problem Relation Age of Onset    Heart Attack Father     Diabetes type 2  Daughter        CareTeam (Including outside providers/suppliers regularly involved in providing care):   Patient Care Team:  Candace Costello PA-C as PCP - General (Physician Assistant)  Candace Costello PA-C as PCP - [facility] Empaneled Provider    Wt Readings from Last 3 Encounters:   08/10/21 147 lb (66.7 kg)   06/04/21 149 lb (67.6 kg)   05/04/21 151 lb (68.5 kg)     Vitals:    08/10/21 0930   BP: 132/80   Pulse: 90   SpO2: 98%   Weight: 147 lb (66.7 kg)   Height: 5' 2\" (1.575 m)     Body mass index is 26.89 kg/m². Based upon direct observation of the patient, evaluation of cognition reveals recent and remote memory intact. General Appearance: alert and oriented to person, place and time, well-developed and well-nourished, in no acute distress  Pulmonary/Chest: clear to auscultation bilaterally- no wheezes, rales or rhonchi, normal air movement, no respiratory distress  Cardiovascular: normal rate, normal S1 and S2, no gallops, intact distal pulses and no carotid bruits  Extremities: no cyanosis and no clubbing    Patient's complete Health Risk Assessment and screening values have been reviewed and are found in Flowsheets. The following problems were reviewed today and where indicated follow up appointments were made and/or referrals ordered. Positive Risk Factor Screenings with Interventions:            General Health and ACP:  General  In general, how would you say your health is?: Good  In the past 7 days, have you experienced any of the following?  New or Increased Pain, New or Increased Fatigue, Loneliness, Social Isolation, Stress or Anger?: None of These  Do you get the social and emotional support that you need?: Yes  Do you have a Living Will?: (!) No  Advance Directives     Power of [address] [place] [address] Will ACP-Advance Directive ACP-Power of     Not on File Not on File Not on File Not on File      General Health Risk Interventions:  · No Living Will: give paperwork on [address] Habits/Nutrition:  Health Habits/Nutrition  Do you exercise for at least 20 minutes 2-3 times per week?: Yes  Have you lost any weight without trying in the past 3 months?: No  Do you eat only one meal per day?: (!) Yes  Have you seen the dentist within the past year?: (!) No  Body mass index: (!) 26.88  Health Habits/Nutrition Interventions:  · Dental exam overdue:  patient encouraged to make appointment with his/her dentist    Hearing/Vision:  No exam data present  Hearing/Vision  Do you or your family notice any trouble with your hearing that hasn't been managed with hearing aids?: No  Do you have difficulty driving, watching TV, or doing any of your daily activities because of your eyesight?: No  Have you had an eye exam within the past year?: (!) No  Hearing/Vision Interventions:  · Vision concerns:  patient encouraged to make appointment with his/her eye specialist      Personalized Preventive Plan   Current Health Maintenance Status  Immunization History   Administered Date(s) Administered    Influenza Virus Vaccine 11/05/2010, 10/12/2011, 09/24/2013, 10/07/2016, 09/11/2017    Influenza, High Dose (Fluzone 65 yrs and older) 10/21/2015    Influenza, Quadv, adjuvanted, 65 yrs +, IM, PF (Fluad) 12/15/2020    Influenza, Triv, inactivated, subunit, adjuvanted, IM (Fluad 65 yrs and older) 09/08/2019    Pneumococcal Conjugate 13-valent (Lblvimr31) 10/21/2015    Pneumococcal Conjugate 7-valent (Prevnar7) 03/20/2017    Pneumococcal Polysaccharide ([id number]) 06/26/2014        Health Maintenance   Topic Date Due    COVID-19 Vaccine (1) Never done    DTaP/Tdap/Td vaccine (1 - Tdap) Never done    Shingles Vaccine (1 of 2) Never done    DEXA (modify frequency per FRAX score)  Never done    Annual Wellness Visit (AWV)  Never done    Flu vaccine (1) 09/01/2021    Lipid screen  01/12/2022    Potassium monitoring 05/06/2022    Creatinine monitoring  05/06/2022    Pneumococcal 65+ yrs at Risk Vaccine  Completed    Hepatitis A vaccine  Aged Out    Hib vaccine  Aged Out    Meningococcal (ACWY) vaccine  Aged Out     Recommendations for [employer] Due: see orders and patient instructions/AVS.  . Recommended screening schedule for the next 5-10 years is provided to the patient in written form: see Patient Irineo Leslie was seen today for medicare aw. 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    Reviewed steps to reduce disease risk including:   · Quitting tobacco use, reducing amount smoked, or not starting the habit  · Making healthy food choices  · Being physically active and gradualy increasing activity levels   · Reduce weight and determine a healthy BMI goal  · Monitor blood pressure and treat if higher than 140/90 mmHg  · Maintain blood total cholesterol levels under 5 mmol/l or 190 mg/dl  · Maintain LDL cholesterol levels under 3.0 mmol/l or 115 mg/dl   · Control blood glucose levels  · Consider taking aspirin (75 mg daily), once blood pressure is controlled   Provided a follow up plan.  Time spent (minutes): 10    Advance Care Planning   Advanced Care Planning: Discussed the patients choices for care and treatment in case of a health event that adversely affects decision-making abilities. Also discussed the patients long-term treatment options. Reviewed with the patient the [address] [place] [address] of [address] [place] [address] Declaration forms  Reviewed the process of designating a competent adult as an Agent (or -in-fact) that could take make health care decisions for the patient if incompetent. Patient was asked to complete the declaration forms, either acknowledge the forms by a public notary or an eligible witness and provide a signed copy to the practice office.   Time spent (minutes): 10

## 2021-08-10 NOTE — PATIENT INSTRUCTIONS
Personalized Preventive Plan for Roni Wills - 8/10/2021  Medicare offers a range of preventive health benefits. Some of the tests and screenings are paid in full while other may be subject to a deductible, co-insurance, and/or copay. Some of these benefits include a comprehensive review of your medical history including lifestyle, illnesses that may run in your family, and various assessments and screenings as appropriate. After reviewing your medical record and screening and assessments performed today your provider may have ordered immunizations, labs, imaging, and/or referrals for you. A list of these orders (if applicable) as well as your Preventive Care list are included within your After Visit Summary for your review. Other Preventive Recommendations:    · A preventive eye exam performed by an eye specialist is recommended every 1-2 years to screen for glaucoma; cataracts, macular degeneration, and other eye disorders. · A preventive dental visit is recommended every 6 months. · Try to get at least 150 minutes of exercise per week or 10,000 steps per day on a pedometer . · Order or download the FREE \"Exercise & Physical Activity: Your Everyday Guide\" from [employer]. Call 9-690.591.6349 or search [employer] online. · You need 6291-8207 mg of calcium and 8241-5071 IU of vitamin D per day. It is possible to meet your calcium requirement with diet alone, but a vitamin D supplement is usually necessary to meet this goal.  · When exposed to the sun, use a sunscreen that protects against both UVA and UVB radiation with an SPF of 30 or greater. Reapply every 2 to 3 hours or after sweating, drying off with a towel, or swimming. · Always wear a seat belt when traveling in a car. Always wear a helmet when riding a bicycle or motorcycle.

## 2021-09-15 RX ORDER — DOCUSATE SODIUM 100 MG/1
100 CAPSULE, LIQUID FILLED ORAL DAILY PRN
Qty: 30 CAPSULE | Refills: 2 | Status: SHIPPED | OUTPATIENT
Start: 2021-09-15 | End: 2021-12-16

## 2021-11-17 ENCOUNTER — TELEPHONE (OUTPATIENT)
Dept: PRIMARY CARE CLINIC | Age: 81
End: 2021-11-17

## 2021-11-17 DIAGNOSIS — E11.65 TYPE 2 DIABETES MELLITUS WITH HYPERGLYCEMIA, WITHOUT LONG-TERM CURRENT USE OF INSULIN (HCC): Primary | ICD-10-CM

## 2021-11-17 RX ORDER — GLIMEPIRIDE 2 MG/1
4 TABLET ORAL EVERY 12 HOURS
Qty: 30 TABLET | Refills: 0 | Status: SHIPPED | OUTPATIENT
Start: 2021-11-17 | End: 2021-11-17 | Stop reason: SDUPTHER

## 2021-11-17 RX ORDER — GLIMEPIRIDE 2 MG/1
4 TABLET ORAL EVERY 12 HOURS
Qty: 60 TABLET | Refills: 1 | Status: SHIPPED | OUTPATIENT
Start: 2021-11-17 | End: 2021-11-25 | Stop reason: SDUPTHER

## 2021-11-17 NOTE — TELEPHONE ENCOUNTER
[facility] is calling and states the patients glimepiride rx was sent in for only 30 tablets and the RX is wrote for 2 tablets every 12 hours. Yosef Hum would like the script corrected or clarification on the script. Please advise.

## 2021-11-24 ENCOUNTER — TELEPHONE (OUTPATIENT)
Dept: PRIMARY CARE CLINIC | Age: 81
End: 2021-11-24

## 2021-11-24 DIAGNOSIS — E11.65 TYPE 2 DIABETES MELLITUS WITH HYPERGLYCEMIA, WITHOUT LONG-TERM CURRENT USE OF INSULIN (HCC): ICD-10-CM

## 2021-11-24 NOTE — TELEPHONE ENCOUNTER
A refill request was done by Nakul Syed for patients Glimepiride - I called to clarify patient is actually taking medication - I do not see any recent refills on medication. I called and spoke with Yeison Patgamaliel, on HIPAA & number listed in patients chart. She is unsure if patient is taking medication - she states she does not think that patient is in need of the medication. Yeison Peacock stated she is going to call [place] and ask her and let the office know if patient needs the medication or not.      A PA request was sent in from patients insurance - when I called to complete PA they informed me that they could not see request and the woman on the phone could not provide me a phone number with anyone who could see the request.

## 2021-11-24 NOTE — TELEPHONE ENCOUNTER
I called Cintia Kellogg back to clarify the way patient is taking medication. Cintia Kellogg states patient is taking one tablet twice a day.

## 2021-11-25 RX ORDER — GLIMEPIRIDE 2 MG/1
2 TABLET ORAL EVERY 12 HOURS
Qty: 60 TABLET | Refills: 1 | Status: SHIPPED | OUTPATIENT
Start: 2021-11-25 | End: 2022-01-06

## 2021-12-01 ENCOUNTER — TELEPHONE (OUTPATIENT)
Dept: PRIMARY CARE CLINIC | Age: 81
End: 2021-12-01

## 2021-12-01 DIAGNOSIS — K57.92 DIVERTICULITIS: Primary | ICD-10-CM

## 2021-12-01 RX ORDER — METRONIDAZOLE 250 MG/1
250 TABLET ORAL 3 TIMES DAILY
Qty: 30 TABLET | Refills: 0 | Status: SHIPPED | OUTPATIENT
Start: 2021-12-01 | End: 2022-06-28 | Stop reason: ALTCHOICE

## 2021-12-01 RX ORDER — CIPROFLOXACIN 500 MG/1
500 TABLET, FILM COATED ORAL 2 TIMES DAILY
Qty: 20 TABLET | Refills: 0 | Status: SHIPPED | OUTPATIENT
Start: 2021-12-01 | End: 2022-01-06 | Stop reason: ALTCHOICE

## 2021-12-01 NOTE — TELEPHONE ENCOUNTER
Pt's daughter called stating pt is having an episode of diverticulitis and has severe pain in her abd. She is in tears due to pain but does not want to go to hospital because she is not vaccinated yet. She is requesting if something can be sent to pharmacy for pain.        Please advise       [address]

## 2022-01-05 DIAGNOSIS — E11.65 TYPE 2 DIABETES MELLITUS WITH HYPERGLYCEMIA, WITHOUT LONG-TERM CURRENT USE OF INSULIN (HCC): ICD-10-CM

## 2022-01-06 RX ORDER — ATORVASTATIN CALCIUM 40 MG/1
40 TABLET, FILM COATED ORAL DAILY
Qty: 30 TABLET | Refills: 11 | Status: SHIPPED | OUTPATIENT
Start: 2022-01-06 | End: 2022-04-26 | Stop reason: SDUPTHER

## 2022-01-06 RX ORDER — GLIMEPIRIDE 2 MG/1
TABLET ORAL
Qty: 60 TABLET | Refills: 1 | Status: SHIPPED | OUTPATIENT
Start: 2022-01-06 | End: 2022-02-16

## 2022-01-26 RX ORDER — LISINOPRIL 5 MG/1
TABLET ORAL
Qty: 30 TABLET | Refills: 2 | Status: SHIPPED | OUTPATIENT
Start: 2022-01-26 | End: 2022-04-20

## 2022-02-16 DIAGNOSIS — E11.65 TYPE 2 DIABETES MELLITUS WITH HYPERGLYCEMIA, WITHOUT LONG-TERM CURRENT USE OF INSULIN (HCC): ICD-10-CM

## 2022-02-16 RX ORDER — GLIMEPIRIDE 2 MG/1
TABLET ORAL
Qty: 60 TABLET | Refills: 0 | Status: SHIPPED | OUTPATIENT
Start: 2022-02-16 | End: 2022-03-31

## 2022-03-16 DIAGNOSIS — E11.65 TYPE 2 DIABETES MELLITUS WITH HYPERGLYCEMIA, WITHOUT LONG-TERM CURRENT USE OF INSULIN (HCC): ICD-10-CM

## 2022-03-28 RX ORDER — GLIMEPIRIDE 2 MG/1
TABLET ORAL
Qty: 60 TABLET | Refills: 0 | OUTPATIENT
Start: 2022-03-28

## 2022-03-31 DIAGNOSIS — E11.65 TYPE 2 DIABETES MELLITUS WITH HYPERGLYCEMIA, WITHOUT LONG-TERM CURRENT USE OF INSULIN (HCC): ICD-10-CM

## 2022-03-31 RX ORDER — GLIMEPIRIDE 2 MG/1
TABLET ORAL
Qty: 60 TABLET | Refills: 0 | Status: SHIPPED | OUTPATIENT
Start: 2022-03-31 | End: 2022-04-26

## 2022-04-20 RX ORDER — LISINOPRIL 5 MG/1
TABLET ORAL
Qty: 30 TABLET | Refills: 0 | Status: SHIPPED | OUTPATIENT
Start: 2022-04-20 | End: 2022-04-26 | Stop reason: SDUPTHER

## 2022-04-26 ENCOUNTER — OFFICE VISIT (OUTPATIENT)
Dept: PRIMARY CARE CLINIC | Age: 82
End: 2022-04-26
Payer: COMMERCIAL

## 2022-04-26 VITALS
BODY MASS INDEX: 27.05 KG/M2 | WEIGHT: 147 LBS | DIASTOLIC BLOOD PRESSURE: 86 MMHG | HEIGHT: 62 IN | SYSTOLIC BLOOD PRESSURE: 142 MMHG | HEART RATE: 105 BPM | OXYGEN SATURATION: 99 %

## 2022-04-26 DIAGNOSIS — E11.41 DIABETIC MONONEUROPATHY ASSOCIATED WITH TYPE 2 DIABETES MELLITUS (HCC): Primary | ICD-10-CM

## 2022-04-26 DIAGNOSIS — I10 ESSENTIAL HYPERTENSION: ICD-10-CM

## 2022-04-26 DIAGNOSIS — E11.41 TYPE 2 DIABETES MELLITUS WITH DIABETIC MONONEUROPATHY, WITH LONG-TERM CURRENT USE OF INSULIN (HCC): ICD-10-CM

## 2022-04-26 DIAGNOSIS — E78.5 HYPERLIPIDEMIA, UNSPECIFIED HYPERLIPIDEMIA TYPE: ICD-10-CM

## 2022-04-26 DIAGNOSIS — E11.65 TYPE 2 DIABETES MELLITUS WITH HYPERGLYCEMIA, WITHOUT LONG-TERM CURRENT USE OF INSULIN (HCC): Primary | ICD-10-CM

## 2022-04-26 DIAGNOSIS — E11.65 TYPE 2 DIABETES MELLITUS WITH HYPERGLYCEMIA, WITHOUT LONG-TERM CURRENT USE OF INSULIN (HCC): ICD-10-CM

## 2022-04-26 DIAGNOSIS — Z79.4 TYPE 2 DIABETES MELLITUS WITH DIABETIC MONONEUROPATHY, WITH LONG-TERM CURRENT USE OF INSULIN (HCC): ICD-10-CM

## 2022-04-26 LAB — HBA1C MFR BLD: 14 %

## 2022-04-26 PROCEDURE — 83036 HEMOGLOBIN GLYCOSYLATED A1C: CPT | Performed by: PHYSICIAN ASSISTANT

## 2022-04-26 PROCEDURE — 99214 OFFICE O/P EST MOD 30 MIN: CPT | Performed by: PHYSICIAN ASSISTANT

## 2022-04-26 PROCEDURE — 3046F HEMOGLOBIN A1C LEVEL >9.0%: CPT | Performed by: PHYSICIAN ASSISTANT

## 2022-04-26 RX ORDER — GABAPENTIN 100 MG/1
100 CAPSULE ORAL 2 TIMES DAILY
Qty: 60 CAPSULE | Refills: 2 | Status: SHIPPED | OUTPATIENT
Start: 2022-04-26 | End: 2022-07-13

## 2022-04-26 RX ORDER — LINAGLIPTIN 5 MG/1
5 TABLET, FILM COATED ORAL DAILY
Qty: 30 TABLET | Refills: 2 | Status: SHIPPED | OUTPATIENT
Start: 2022-04-26 | End: 2022-06-30

## 2022-04-26 RX ORDER — LISINOPRIL 10 MG/1
10 TABLET ORAL DAILY
Qty: 30 TABLET | Refills: 5 | Status: SHIPPED | OUTPATIENT
Start: 2022-04-26 | End: 2022-08-31

## 2022-04-26 RX ORDER — ATORVASTATIN CALCIUM 40 MG/1
40 TABLET, FILM COATED ORAL DAILY
Qty: 30 TABLET | Refills: 11 | Status: SHIPPED | OUTPATIENT
Start: 2022-04-26

## 2022-04-26 RX ORDER — FLASH GLUCOSE SENSOR
KIT MISCELLANEOUS
Qty: 1 EACH | Refills: 5 | Status: SHIPPED | OUTPATIENT
Start: 2022-04-26 | End: 2022-04-26 | Stop reason: SDUPTHER

## 2022-04-26 RX ORDER — TRAZODONE HYDROCHLORIDE 50 MG/1
50 TABLET ORAL NIGHTLY
Qty: 30 TABLET | Refills: 5 | Status: SHIPPED | OUTPATIENT
Start: 2022-04-26 | End: 2022-08-01 | Stop reason: ALTCHOICE

## 2022-04-26 RX ORDER — FLASH GLUCOSE SCANNING READER
EACH MISCELLANEOUS
Qty: 1 EACH | Refills: 0 | Status: SHIPPED | OUTPATIENT
Start: 2022-04-26 | End: 2022-06-28 | Stop reason: ALTCHOICE

## 2022-04-26 RX ORDER — INSULIN DETEMIR 100 [IU]/ML
INJECTION, SOLUTION SUBCUTANEOUS
Qty: 5 PEN | Refills: 3 | Status: CANCELLED | OUTPATIENT
Start: 2022-04-26

## 2022-04-26 RX ORDER — LISINOPRIL 5 MG/1
TABLET ORAL
Qty: 30 TABLET | Refills: 5 | Status: SHIPPED
Start: 2022-04-26 | End: 2022-04-26 | Stop reason: DRUGHIGH

## 2022-04-26 RX ORDER — INSULIN DETEMIR 100 [IU]/ML
20 INJECTION, SOLUTION SUBCUTANEOUS NIGHTLY
Qty: 10 PEN | Refills: 1 | Status: SHIPPED | OUTPATIENT
Start: 2022-04-26 | End: 2022-07-11

## 2022-04-26 RX ORDER — FLASH GLUCOSE SENSOR
KIT MISCELLANEOUS
Qty: 5 EACH | Refills: 5 | Status: SHIPPED | OUTPATIENT
Start: 2022-04-26 | End: 2022-06-28 | Stop reason: ALTCHOICE

## 2022-04-26 RX ORDER — FUROSEMIDE 40 MG/1
TABLET ORAL
COMMUNITY
Start: 2022-04-02 | End: 2022-08-01 | Stop reason: ALTCHOICE

## 2022-04-26 RX ORDER — GLIMEPIRIDE 2 MG/1
TABLET ORAL
Qty: 60 TABLET | Refills: 2 | Status: SHIPPED | OUTPATIENT
Start: 2022-04-26 | End: 2022-06-21

## 2022-04-26 RX ORDER — FLASH GLUCOSE SCANNING READER
EACH MISCELLANEOUS
Qty: 2 EACH | Refills: 11 | Status: SHIPPED | OUTPATIENT
Start: 2022-04-26 | End: 2022-06-28 | Stop reason: ALTCHOICE

## 2022-04-26 SDOH — ECONOMIC STABILITY: FOOD INSECURITY: WITHIN THE PAST 12 MONTHS, YOU WORRIED THAT YOUR FOOD WOULD RUN OUT BEFORE YOU GOT MONEY TO BUY MORE.: NEVER TRUE

## 2022-04-26 SDOH — ECONOMIC STABILITY: FOOD INSECURITY: WITHIN THE PAST 12 MONTHS, THE FOOD YOU BOUGHT JUST DIDN'T LAST AND YOU DIDN'T HAVE MONEY TO GET MORE.: NEVER TRUE

## 2022-04-26 ASSESSMENT — ENCOUNTER SYMPTOMS
VOMITING: 0
ABDOMINAL PAIN: 0
SINUS PRESSURE: 0
CONSTIPATION: 0
APNEA: 0
SORE THROAT: 0
NAUSEA: 0
SHORTNESS OF BREATH: 0
COLOR CHANGE: 0
DIARRHEA: 0
COUGH: 0
CHEST TIGHTNESS: 0
RHINORRHEA: 0

## 2022-04-26 ASSESSMENT — SOCIAL DETERMINANTS OF HEALTH (SDOH): HOW HARD IS IT FOR YOU TO PAY FOR THE VERY BASICS LIKE FOOD, HOUSING, MEDICAL CARE, AND HEATING?: NOT HARD AT ALL

## 2022-04-26 NOTE — PROGRESS NOTES
[facility]  [address] [place] [id number]  Dept: Cinthya is a 80 y.o. female Established patient, who presents today for her medical conditions/complaints as noted below  Chief Complaint   Patient presents with    Diabetes     Patient said she does not check sugar due to having to poke herself       HPI:     Diabetes  She presents for her follow-up diabetic visit. She has type 2 diabetes mellitus. Her disease course has been worsening. There are no hypoglycemic associated symptoms. Pertinent negatives for hypoglycemia include no dizziness, headaches, nervousness/anxiousness or seizures. Associated symptoms include polydipsia and polyuria. Pertinent negatives for diabetes include no chest pain, no fatigue, no polyphagia and no weakness. Symptoms are worsening. Current diabetic treatment includes insulin injections and oral agent (dual therapy). Her weight is stable. She rarely participates in exercise. Home blood sugar record trend: not checking. An ACE inhibitor/angiotensin II receptor blocker is being taken (and statin). She does not see a podiatrist.Eye exam is not current.      A1C is >14 today   Reviewed prior notes None  Reviewed previous Labs    Hemoglobin A1C (%)   Date Value   04/26/2022 14.0   06/04/2021 7.6   12/15/2020 9.9             ( goal A1C is < 7)   No results found for: LABMICR  LDL Calculated (mg/dL)   Date Value   01/12/2021 66   07/10/2020 141       (goal LDL is <100)   AST (U/L)   Date Value   05/06/2021 261 (H)     ALT (U/L)   Date Value   05/06/2021 169 (H)     BUN (mg/dL)   Date Value   05/06/2021 38 (H)     BP Readings from Last 3 Encounters:   04/26/22 (!) 142/86   08/10/21 132/80   06/04/21 126/80          (goal 140/90)    Past Medical History:   Diagnosis Date    Cancer ([facility] [address]     breast cancer    Hypertension     Ischemic optic neuropathy     Type 2 diabetes mellitus without complication ([facility] [address] Social History     Tobacco Use    Smoking status: Former Smoker     Packs/day: 0.50     Years: 10.00     Pack years: 5.00     Quit date: 2018     Years since quittin.3    Smokeless tobacco: Never Used   Substance Use Topics    Alcohol use: Not Currently      Current Outpatient Medications   Medication Sig Dispense Refill    furosemide (LASIX) 40 MG tablet       traZODone (DESYREL) 50 MG tablet Take 1 tablet by mouth nightly 30 tablet 5    TRADJENTA 5 MG tablet Take 1 tablet by mouth daily 30 tablet 2    atorvastatin (LIPITOR) 40 MG tablet Take 1 tablet by mouth daily 30 tablet 11    Continuous Blood Gluc  (FREESTYLE CHUCK 14 DAY READER) PRISCILLA Use reader to check sugar 2 each 11    insulin detemir (LEVEMIR FLEXTOUCH) 100 UNIT/ML injection pen Inject 20 Units into the skin nightly 10 pen 1    metFORMIN (GLUCOPHAGE) 1000 MG tablet Take 1 tablet by mouth 2 times daily (with meals) 60 tablet 2    gabapentin (NEURONTIN) 100 MG capsule Take 1 capsule by mouth 2 times daily for 30 days. Intended supply: 30 days 60 capsule 2    lisinopril (PRINIVIL;ZESTRIL) 10 MG tablet Take 1 tablet by mouth daily 30 tablet 5    docusate sodium (COLACE) 100 MG capsule TAKE 1 CAPSULE BY MOUTH DAILY AS NEEDED FOR CONSTIPATION 30 capsule 1    meclizine (ANTIVERT) 25 MG tablet TAKE ONE TABLET BY MOUTH THREE TIMES A DAY AS NEEDED FOR DIZZINESS 30 tablet 1    aspirin EC 81 MG EC tablet Take 1 tablet by mouth daily 90 tablet 1    glimepiride (AMARYL) 2 MG tablet TAKE ONE TABLET BY MOUTH EVERY 12 HOURS 60 tablet 2    Continuous Blood Gluc  (FREESTYLE CHUCK 14 DAY READER) PRISCILLA Check sugar BID 1 each 0    Continuous Blood Gluc Sensor (FREESTYLE CHUCK 14 DAY SENSOR) MISC Change sensor every 14 days.  5 each 5    metroNIDAZOLE (FLAGYL) 250 MG tablet Take 1 tablet by mouth 3 times daily 30 tablet 0    metoprolol tartrate (LOPRESSOR) 25 MG tablet  (Patient not taking: Reported on 8/10/2021)       No current facility-administered medications for this visit. No Known Allergies    Health Maintenance   Topic Date Due    COVID-19 Vaccine (1) Never done    DTaP/Tdap/Td vaccine (1 - Tdap) Never done    Shingles Vaccine (1 of 2) Never done    Pneumococcal 65+ years Vaccine (3 - PPSV23 or PCV20) 06/26/2019    Depression Screen  08/10/2022    Annual Wellness Visit (AWV)  08/11/2022    Flu vaccine (Season Ended) 09/01/2022    Potassium  10/06/2022    Creatinine  10/06/2022    Hepatitis A vaccine  Aged Out    Hib vaccine  Aged Out    Meningococcal (ACWY) vaccine  Aged Out       Subjective:     Review of Systems   Constitutional: Negative for appetite change, chills, fatigue and fever. HENT: Negative for dental problem, rhinorrhea, sinus pressure, sneezing and sore throat. Respiratory: Negative for apnea, cough, chest tightness and shortness of breath. Cardiovascular: Negative for chest pain, palpitations and leg swelling. Gastrointestinal: Negative for abdominal pain, constipation, diarrhea, nausea and vomiting. Endocrine: Positive for polydipsia and polyuria. Negative for polyphagia. Genitourinary: Negative for dysuria, frequency, hematuria and urgency. Musculoskeletal: Positive for myalgias (left leg numbness and heaviness). Negative for arthralgias and joint swelling. Skin: Negative for color change and rash. Neurological: Negative for dizziness, seizures, syncope, weakness, light-headedness, numbness and headaches. Hematological: Negative for adenopathy. Psychiatric/Behavioral: Negative for sleep disturbance. The patient is not nervous/anxious. Objective:     BP (!) 142/86   Pulse 105   Ht 5' 2\" (1.575 m)   Wt 147 lb (66.7 kg)   SpO2 99%   BMI 26.89 kg/m²   Physical Exam  Vitals reviewed. Neck:      Thyroid: No thyromegaly. Vascular: No carotid bruit. Cardiovascular:      Rate and Rhythm: Normal rate and regular rhythm. Heart sounds: Normal heart sounds.  No murmur heard. No friction rub. No gallop. Pulmonary:      Effort: Pulmonary effort is normal.      Breath sounds: Normal breath sounds. Musculoskeletal:      Cervical back: No edema. Neurological:      Mental Status: She is alert and oriented to person, place, and time. Assessment:       Diagnosis Orders   1. Diabetic mononeuropathy associated with type 2 diabetes mellitus (HCC)  gabapentin (NEURONTIN) 100 MG capsule   2. Type 2 diabetes mellitus with diabetic mononeuropathy, with long-term current use of insulin (HCC)  TRADJENTA 5 MG tablet    POCT glycosylated hemoglobin (Hb A1C)    Continuous Blood Gluc  (BeDoE 14 DAY READER) PRISCILLA    insulin detemir (LEVEMIR FLEXTOUCH) 100 UNIT/ML injection pen    metFORMIN (GLUCOPHAGE) 1000 MG tablet    Basic Metabolic Panel   3. Essential hypertension  DISCONTINUED: lisinopril (PRINIVIL;ZESTRIL) 5 MG tablet   4. Hyperlipidemia, unspecified hyperlipidemia type  atorvastatin (LIPITOR) 40 MG tablet    Lipid Panel    ALT    AST        Plan:     Recheck BP  Pneumovax today--not needed though  shows it. Educated patient on hyperglycemia symptoms  Start Metformin again  Increased Lisinopril  Increased Levemir  Continue Trajenta and Glimepiride  BW ordered  Ordered Free style Chuck reader and device--fax to DME on [place]  Check sugars regularly  Start gabapentin      Return in about 3 months (around 7/26/2022) for Diabetes.     Orders Placed This Encounter   Procedures    Lipid Panel     Standing Status:   Future     Standing Expiration Date:   4/26/2023     Order Specific Question:   Is Patient Fasting?/# of Hours     Answer:   10-12 hours    ALT     Standing Status:   Future     Standing Expiration Date:   4/26/2023    AST     Standing Status:   Future     Standing Expiration Date:   4/26/2023    Basic Metabolic Panel     Standing Status:   Future     Standing Expiration Date:   4/26/2023    POCT glycosylated hemoglobin (Hb A1C)     Orders Placed This Encounter   Medications    traZODone (DESYREL) 50 MG tablet     Sig: Take 1 tablet by mouth nightly     Dispense:  30 tablet     Refill:  5    TRADJENTA 5 MG tablet     Sig: Take 1 tablet by mouth daily     Dispense:  30 tablet     Refill:  2    DISCONTD: lisinopril (PRINIVIL;ZESTRIL) 5 MG tablet     Sig: TAKE ONE TABLET BY MOUTH ONCE DAILY     Dispense:  30 tablet     Refill:  5    atorvastatin (LIPITOR) 40 MG tablet     Sig: Take 1 tablet by mouth daily     Dispense:  30 tablet     Refill:  11    Continuous Blood Gluc  (FREESTYLE CHUCK 14 DAY READER) PRISCILLA     Sig: Use reader to check sugar     Dispense:  2 each     Refill:  11    insulin detemir (LEVEMIR FLEXTOUCH) 100 UNIT/ML injection pen     Sig: Inject 20 Units into the skin nightly     Dispense:  10 pen     Refill:  1    metFORMIN (GLUCOPHAGE) 1000 MG tablet     Sig: Take 1 tablet by mouth 2 times daily (with meals)     Dispense:  60 tablet     Refill:  2    gabapentin (NEURONTIN) 100 MG capsule     Sig: Take 1 capsule by mouth 2 times daily for 30 days. Intended supply: 30 days     Dispense:  60 capsule     Refill:  2    lisinopril (PRINIVIL;ZESTRIL) 10 MG tablet     Sig: Take 1 tablet by mouth daily     Dispense:  30 tablet     Refill:  5       Patient given educational materials - see patient instructions. Discussed use, benefit, and side effects of prescribed medications. All patient questions answered. Pt voiced understanding. Reviewed health maintenance. Instructed to continue current medications, diet and exercise. Patient agreed with treatment plan. Follow up as directed.      Electronicallysigned by Tisha Best PA-C on 4/26/2022 at 5:45 PM

## 2022-05-25 ENCOUNTER — TELEPHONE (OUTPATIENT)
Dept: PRIMARY CARE CLINIC | Age: 82
End: 2022-05-25

## 2022-05-25 LAB
ALT SERPL-CCNC: 29 U/L (ref 0–31)
ANION GAP SERPL CALCULATED.3IONS-SCNC: 15 MMOL/L (ref 5–15)
AST SERPL-CCNC: 19 U/L (ref 0–41)
BUN BLDV-MCNC: 57 MG/DL (ref 5–27)
CALCIUM SERPL-MCNC: 9.6 MG/DL (ref 8.5–10.5)
CHLORIDE BLD-SCNC: 100 MMOL/L (ref 98–109)
CHOLESTEROL/HDL RATIO: 3.7 (ref 1–5)
CHOLESTEROL: 134 MG/DL (ref 150–200)
CO2: 20 MMOL/L (ref 22–32)
CREAT SERPL-MCNC: 2.02 MG/DL (ref 0.4–1)
EGFR AFRICAN AMERICAN: 29 ML/MIN/1.73SQ.M
EGFR IF NONAFRICAN AMERICAN: 24 ML/MIN/1.73SQ.M
GLUCOSE: 543 MG/DL (ref 65–99)
HDLC SERPL-MCNC: 36 MG/DL
LDL CHOLESTEROL CALCULATED: 59 MG/DL
LDL/HDL RATIO: 1.6
POTASSIUM SERPL-SCNC: 5 MMOL/L (ref 3.5–5)
SODIUM BLD-SCNC: 135 MMOL/L (ref 134–146)
TRIGL SERPL-MCNC: 195 MG/DL (ref 27–150)
VLDLC SERPL CALC-MCNC: 39 MG/DL (ref 0–30)

## 2022-05-25 RX ORDER — INSULIN ASPART 100 [IU]/ML
INJECTION, SOLUTION INTRAVENOUS; SUBCUTANEOUS
Qty: 5 PEN | Refills: 0 | Status: SHIPPED | OUTPATIENT
Start: 2022-05-25 | End: 2022-06-02

## 2022-05-25 NOTE — TELEPHONE ENCOUNTER
She did not have any fast acting at home. Advised daughter she needs to go to ED. She asked what if she wouldn't go, I advised her to tell her that she is as risk of stroke with her sugar that high and to tell her the ramifications of that. She said she will get on it and try to get her to go.

## 2022-05-25 NOTE — TELEPHONE ENCOUNTER
Please check with patient and ask if she has fasting acting insulin (novolog or humalog) If so, she needs to give herself 10U. If not, she needs to go to the ER to see why her sugar is so high. I will send some fast acting insulin to pharmacy for future use and she needs to  the sliding scale here at the office.

## 2022-05-25 NOTE — TELEPHONE ENCOUNTER
Saw that lab was from 8:20 am and had her call [facility]. Daughter checked her sugar again and it was too high to register so I instructed her to take [place] to the ER. Her Kidney function is also elevated.

## 2022-05-31 ENCOUNTER — TELEPHONE (OUTPATIENT)
Dept: PRIMARY CARE CLINIC | Age: 82
End: 2022-05-31

## 2022-06-02 RX ORDER — INSULIN ASPART 100 [IU]/ML
INJECTION, SOLUTION INTRAVENOUS; SUBCUTANEOUS
Qty: 15 ML | Refills: 2 | Status: SHIPPED | OUTPATIENT
Start: 2022-06-02 | End: 2022-08-24

## 2022-06-13 DIAGNOSIS — E11.41 TYPE 2 DIABETES MELLITUS WITH DIABETIC MONONEUROPATHY, WITH LONG-TERM CURRENT USE OF INSULIN (HCC): ICD-10-CM

## 2022-06-13 DIAGNOSIS — Z79.4 TYPE 2 DIABETES MELLITUS WITH DIABETIC MONONEUROPATHY, WITH LONG-TERM CURRENT USE OF INSULIN (HCC): ICD-10-CM

## 2022-06-21 DIAGNOSIS — E11.65 TYPE 2 DIABETES MELLITUS WITH HYPERGLYCEMIA, WITHOUT LONG-TERM CURRENT USE OF INSULIN (HCC): ICD-10-CM

## 2022-06-21 RX ORDER — GLIMEPIRIDE 2 MG/1
TABLET ORAL
Qty: 60 TABLET | Refills: 2 | Status: SHIPPED | OUTPATIENT
Start: 2022-06-21 | End: 2022-08-01

## 2022-06-28 ENCOUNTER — OFFICE VISIT (OUTPATIENT)
Dept: PRIMARY CARE CLINIC | Age: 82
End: 2022-06-28
Payer: COMMERCIAL

## 2022-06-28 VITALS
HEIGHT: 62 IN | SYSTOLIC BLOOD PRESSURE: 142 MMHG | BODY MASS INDEX: 26.68 KG/M2 | WEIGHT: 145 LBS | HEART RATE: 109 BPM | OXYGEN SATURATION: 98 % | DIASTOLIC BLOOD PRESSURE: 84 MMHG

## 2022-06-28 DIAGNOSIS — R30.0 DYSURIA: ICD-10-CM

## 2022-06-28 DIAGNOSIS — E11.65 TYPE 2 DIABETES MELLITUS WITH HYPERGLYCEMIA, WITHOUT LONG-TERM CURRENT USE OF INSULIN (HCC): Primary | ICD-10-CM

## 2022-06-28 PROBLEM — R73.9 HYPERGLYCEMIA: Status: ACTIVE | Noted: 2022-05-25

## 2022-06-28 LAB
BILIRUBIN, POC: NORMAL
BLOOD URINE, POC: NORMAL
CLARITY, POC: NORMAL
COLOR, POC: NORMAL
GLUCOSE URINE, POC: 500
HBA1C MFR BLD: 9.9 %
KETONES, POC: NORMAL
LEUKOCYTE EST, POC: NORMAL
NITRITE, POC: NORMAL
PH, POC: 5.5
PROTEIN, POC: 100
SPECIFIC GRAVITY, POC: 1.02
UROBILINOGEN, POC: 0.2

## 2022-06-28 PROCEDURE — 3046F HEMOGLOBIN A1C LEVEL >9.0%: CPT | Performed by: PHYSICIAN ASSISTANT

## 2022-06-28 PROCEDURE — 1123F ACP DISCUSS/DSCN MKR DOCD: CPT | Performed by: PHYSICIAN ASSISTANT

## 2022-06-28 PROCEDURE — 99214 OFFICE O/P EST MOD 30 MIN: CPT | Performed by: PHYSICIAN ASSISTANT

## 2022-06-28 PROCEDURE — 81003 URINALYSIS AUTO W/O SCOPE: CPT | Performed by: PHYSICIAN ASSISTANT

## 2022-06-28 ASSESSMENT — ENCOUNTER SYMPTOMS
BACK PAIN: 0
RHINORRHEA: 0
CONSTIPATION: 0
COUGH: 0
CHEST TIGHTNESS: 0
SINUS PRESSURE: 0
SORE THROAT: 0
COLOR CHANGE: 0
DIARRHEA: 0
ABDOMINAL PAIN: 0
VOMITING: 0
SHORTNESS OF BREATH: 0
NAUSEA: 0

## 2022-06-28 ASSESSMENT — PATIENT HEALTH QUESTIONNAIRE - PHQ9
SUM OF ALL RESPONSES TO PHQ QUESTIONS 1-9: 0
SUM OF ALL RESPONSES TO PHQ9 QUESTIONS 1 & 2: 0
1. LITTLE INTEREST OR PLEASURE IN DOING THINGS: 0
SUM OF ALL RESPONSES TO PHQ QUESTIONS 1-9: 0
2. FEELING DOWN, DEPRESSED OR HOPELESS: 0
SUM OF ALL RESPONSES TO PHQ QUESTIONS 1-9: 0
SUM OF ALL RESPONSES TO PHQ QUESTIONS 1-9: 0

## 2022-06-28 NOTE — PROGRESS NOTES
[facility]  [address] [place] [id number]  Dept: Cinthya is a 80 y.o. female who presents today for her medical conditions/complaints as noted below. Chief Complaint   Patient presents with    Diabetes     Patient was admitted to [facility] on 22-22. Pt said she checks her sugar and it runs in the 200's       HPI:     HPI  Patient went to hospital 22 with hyperglycemia and was found to have JENNIFER on CKD  Take Levemir 20U at 6pm, not taking Metformin because \"it makes me sick\", taking Trajenda and glimipiride. A1C is 9.9 today down form 14  234 this morning.     Sees Dr. Khushbu Fisher in 3 months  Looks like she refused home care at the hospital.      LDL Calculated (mg/dL)   Date Value   2022 59   2021 66   07/10/2020 141       (goal LDL is <100)   AST (U/L)   Date Value   2022 19     ALT (U/L)   Date Value   2022 29     BUN (mg/dL)   Date Value   2022 57 (H)     BP Readings from Last 3 Encounters:   22 (!) 142/84   22 (!) 142/86   08/10/21 132/80          (goal 120/80)    Past Medical History:   Diagnosis Date    Cancer ([facility] [address]     breast cancer    Hypertension     Ischemic optic neuropathy     Type 2 diabetes mellitus without complication ([facility] [address]       Past Surgical History:   Procedure Laterality Date    BACK SURGERY      BREAST SURGERY      lumpectomy left    HYSTERECTOMY, VAGINAL         Family History   Problem Relation Age of Onset    Heart Attack Father     Diabetes type 2  Daughter        Social History     Tobacco Use    Smoking status: Former Smoker     Packs/day: 0.50     Years: 10.00     Pack years: 5.00     Quit date: 2018     Years since quittin.4    Smokeless tobacco: Never Used   Substance Use Topics    Alcohol use: Not Currently      Current Outpatient Medications   Medication Sig Dispense Refill    BREO ELLIPTA 200-25 MCG/INH AEPB inhaler       glimepiride (AMARYL) 2 MG tablet TAKE ONE TABLET BY MOUTH EVERY 12 HOURS 60 tablet 2    NOVOLOG FLEXPEN 100 UNIT/ML injection pen PER SLIDING SCALE 15 mL 2    furosemide (LASIX) 40 MG tablet       traZODone (DESYREL) 50 MG tablet Take 1 tablet by mouth nightly 30 tablet 5    TRADJENTA 5 MG tablet Take 1 tablet by mouth daily 30 tablet 2    atorvastatin (LIPITOR) 40 MG tablet Take 1 tablet by mouth daily 30 tablet 11    insulin detemir (LEVEMIR FLEXTOUCH) 100 UNIT/ML injection pen Inject 20 Units into the skin nightly 10 pen 1    gabapentin (NEURONTIN) 100 MG capsule Take 1 capsule by mouth 2 times daily for 30 days. Intended supply: 30 days 60 capsule 2    lisinopril (PRINIVIL;ZESTRIL) 10 MG tablet Take 1 tablet by mouth daily 30 tablet 5    docusate sodium (COLACE) 100 MG capsule TAKE 1 CAPSULE BY MOUTH DAILY AS NEEDED FOR CONSTIPATION 30 capsule 1    meclizine (ANTIVERT) 25 MG tablet TAKE ONE TABLET BY MOUTH THREE TIMES A DAY AS NEEDED FOR DIZZINESS 30 tablet 1    metoprolol tartrate (LOPRESSOR) 25 MG tablet       aspirin EC 81 MG EC tablet Take 1 tablet by mouth daily 90 tablet 1     No current facility-administered medications for this visit. No Known Allergies    Health Maintenance   Topic Date Due    COVID-19 Vaccine (1) Never done    DTaP/Tdap/Td vaccine (1 - Tdap) Never done    Shingles vaccine (1 of 2) Never done   [employer] Visit (AWV)  08/11/2022    Flu vaccine (Season Ended) 09/01/2022    Depression Screen  06/28/2023    Pneumococcal 65+ years Vaccine  Completed    Hepatitis A vaccine  Aged Out    Hib vaccine  Aged Out    Meningococcal (ACWY) vaccine  Aged Out       Subjective:      Review of Systems   Constitutional: Negative for appetite change, chills, fatigue and fever. HENT: Negative for dental problem, rhinorrhea, sinus pressure, sneezing and sore throat. Respiratory: Negative for cough, chest tightness and shortness of breath.     Cardiovascular: Negative for chest pain, palpitations and leg swelling. Gastrointestinal: Negative for abdominal pain, constipation, diarrhea, nausea and vomiting. Endocrine: Negative for polydipsia, polyphagia and polyuria. Genitourinary: Positive for dysuria. Negative for frequency, hematuria and urgency. Musculoskeletal: Negative for back pain. Skin: Negative for color change and rash. Neurological: Negative for dizziness, seizures, syncope, weakness, light-headedness, numbness and headaches. Hematological: Negative for adenopathy. Objective:     BP (!) 142/84   Pulse (!) 109   Ht 5' 2\" (1.575 m)   Wt 145 lb (65.8 kg)   SpO2 98%   BMI 26.52 kg/m²   Physical Exam  Vitals and nursing note reviewed. Constitutional:       General: She is not in acute distress. Appearance: She is well-developed. She is not ill-appearing. HENT:      Head: Normocephalic and atraumatic. Right Ear: External ear normal.      Left Ear: External ear normal.   Eyes:      General: No scleral icterus. Right eye: No discharge. Left eye: No discharge. Conjunctiva/sclera: Conjunctivae normal.   Neck:      Thyroid: No thyromegaly. Trachea: No tracheal deviation. Cardiovascular:      Rate and Rhythm: Normal rate and regular rhythm. Heart sounds: Normal heart sounds. Pulmonary:      Effort: Pulmonary effort is normal. No respiratory distress. Breath sounds: Normal breath sounds. No wheezing. Lymphadenopathy:      Cervical: No cervical adenopathy. Skin:     General: Skin is warm. Findings: No rash. Neurological:      Mental Status: She is alert and oriented to person, place, and time. Psychiatric:         Mood and Affect: Mood normal.         Behavior: Behavior normal.         Thought Content: Thought content normal.         Assessment:       Diagnosis Orders   1.  Type 2 diabetes mellitus with hyperglycemia, without long-term current use of insulin ([facility])  POCT glycosylated hemoglobin (Hb A1C)    [address]   2. Dysuria  POCT Urinalysis No Micro (Auto)    Culture, Urine        Plan:    Check sugars 3 times a day before every meal---says she does not want a CGM  Add Novolog to meals by sliding scale  Recheck BP  Keep food diary  Dietician referral   Home care nurse for help with intensive insulin training and dietican, if they have   Spent 45 minutes with patient and friend today  MA to call daughter about the insulin with meals as Rafael Florian is very nervous about this. Urine sent for culture today     Return in about 1 month (around 7/28/2022) for Diabetes. Orders Placed This Encounter   Procedures    Culture, Urine     Standing Status:   Future     Standing Expiration Date:   6/28/2023     Order Specific Question:   Specify (ex-cath, midstream, cysto, etc)? Answer:   midstream   [address]     Referral Priority:   Routine     Referral Type:   Home Health Care     Referral Reason:   Specialty Services Required     Referral Location:   [facility]     Requested Specialty:   [address]     Number of Visits Requested:   1    POCT glycosylated hemoglobin (Hb A1C)    POCT Urinalysis No Micro (Auto)     No orders of the defined types were placed in this encounter. Patient given educational materials - see patient instructions. Discussed use, benefit, and side effects of prescribed medications. All patient questions answered. Pt voiced understanding. Reviewed health maintenance. Instructed to continue current medications, diet and exercise. Patient agreed with treatment plan. Follow up as directed.      Electronically signed by Bethel Lawson PA-C on 6/28/2022 at 11:55 AM

## 2022-06-29 LAB
CULTURE: NORMAL
SPECIMEN DESCRIPTION: NORMAL

## 2022-06-30 DIAGNOSIS — E11.41 TYPE 2 DIABETES MELLITUS WITH DIABETIC MONONEUROPATHY, WITH LONG-TERM CURRENT USE OF INSULIN (HCC): ICD-10-CM

## 2022-06-30 DIAGNOSIS — Z79.4 TYPE 2 DIABETES MELLITUS WITH DIABETIC MONONEUROPATHY, WITH LONG-TERM CURRENT USE OF INSULIN (HCC): ICD-10-CM

## 2022-06-30 RX ORDER — LINAGLIPTIN 5 MG/1
TABLET, FILM COATED ORAL
Qty: 30 TABLET | Refills: 2 | Status: SHIPPED | OUTPATIENT
Start: 2022-06-30 | End: 2022-08-01 | Stop reason: ALTCHOICE

## 2022-07-11 DIAGNOSIS — E11.41 TYPE 2 DIABETES MELLITUS WITH DIABETIC MONONEUROPATHY, WITH LONG-TERM CURRENT USE OF INSULIN (HCC): ICD-10-CM

## 2022-07-11 DIAGNOSIS — Z79.4 TYPE 2 DIABETES MELLITUS WITH DIABETIC MONONEUROPATHY, WITH LONG-TERM CURRENT USE OF INSULIN (HCC): ICD-10-CM

## 2022-07-11 RX ORDER — INSULIN DETEMIR 100 [IU]/ML
20 INJECTION, SOLUTION SUBCUTANEOUS NIGHTLY
Qty: 30 ML | Refills: 1 | Status: SHIPPED | OUTPATIENT
Start: 2022-07-11

## 2022-07-13 DIAGNOSIS — E11.41 DIABETIC MONONEUROPATHY ASSOCIATED WITH TYPE 2 DIABETES MELLITUS (HCC): ICD-10-CM

## 2022-07-13 RX ORDER — GABAPENTIN 100 MG/1
CAPSULE ORAL
Qty: 60 CAPSULE | Refills: 0 | Status: SHIPPED | OUTPATIENT
Start: 2022-07-13 | End: 2022-08-01 | Stop reason: ALTCHOICE

## 2022-08-01 ENCOUNTER — OFFICE VISIT (OUTPATIENT)
Dept: PRIMARY CARE CLINIC | Age: 82
End: 2022-08-01
Payer: COMMERCIAL

## 2022-08-01 VITALS
HEIGHT: 62 IN | OXYGEN SATURATION: 100 % | HEART RATE: 78 BPM | WEIGHT: 143.6 LBS | BODY MASS INDEX: 26.43 KG/M2 | SYSTOLIC BLOOD PRESSURE: 142 MMHG | DIASTOLIC BLOOD PRESSURE: 82 MMHG

## 2022-08-01 DIAGNOSIS — E11.65 TYPE 2 DIABETES MELLITUS WITH HYPERGLYCEMIA, WITHOUT LONG-TERM CURRENT USE OF INSULIN (HCC): ICD-10-CM

## 2022-08-01 DIAGNOSIS — Z79.4 TYPE 2 DIABETES MELLITUS WITH DIABETIC MONONEUROPATHY, WITH LONG-TERM CURRENT USE OF INSULIN (HCC): Primary | ICD-10-CM

## 2022-08-01 DIAGNOSIS — I10 ESSENTIAL HYPERTENSION: ICD-10-CM

## 2022-08-01 DIAGNOSIS — E11.41 TYPE 2 DIABETES MELLITUS WITH DIABETIC MONONEUROPATHY, WITH LONG-TERM CURRENT USE OF INSULIN (HCC): Primary | ICD-10-CM

## 2022-08-01 PROCEDURE — 3046F HEMOGLOBIN A1C LEVEL >9.0%: CPT | Performed by: PHYSICIAN ASSISTANT

## 2022-08-01 PROCEDURE — 1123F ACP DISCUSS/DSCN MKR DOCD: CPT | Performed by: PHYSICIAN ASSISTANT

## 2022-08-01 PROCEDURE — 99213 OFFICE O/P EST LOW 20 MIN: CPT | Performed by: PHYSICIAN ASSISTANT

## 2022-08-01 RX ORDER — GLIMEPIRIDE 2 MG/1
2 TABLET ORAL
Qty: 60 TABLET | Refills: 2 | Status: SHIPPED | OUTPATIENT
Start: 2022-08-01 | End: 2022-09-28

## 2022-08-01 RX ORDER — BUDESONIDE AND FORMOTEROL FUMARATE DIHYDRATE 80; 4.5 UG/1; UG/1
2 AEROSOL RESPIRATORY (INHALATION) 2 TIMES DAILY
Qty: 10.2 G | Refills: 3
Start: 2022-08-01

## 2022-08-01 ASSESSMENT — ENCOUNTER SYMPTOMS
ABDOMINAL PAIN: 0
SORE THROAT: 0
COLOR CHANGE: 0
CHEST TIGHTNESS: 0
SINUS PRESSURE: 0
DIARRHEA: 0
VOMITING: 0
NAUSEA: 0
COUGH: 0
RHINORRHEA: 0
SHORTNESS OF BREATH: 0
CONSTIPATION: 0

## 2022-08-01 NOTE — PROGRESS NOTES
[facility]  [address] [place] [id number]  Dept: Cinthya is a 80 y.o. female Established patient, who presents today for her medical conditions/complaints as noted below  Chief Complaint   Patient presents with    Diabetes     Diabetes f/u. Patient forgot sugar log. Patient said her sugar readings have been better. HPI:     Diabetes  She presents for her follow-up diabetic visit. She has type 2 diabetes mellitus. Her disease course has been improving. Hypoglycemia symptoms include nervousness/anxiousness and sweats. Pertinent negatives for hypoglycemia include no dizziness, headaches or seizures. (At 2am and this am---does not always have a bedtime snack) Pertinent negatives for diabetes include no chest pain, no fatigue, no polydipsia, no polyphagia, no polyuria and no weakness. Current diabetic treatment includes intensive insulin program and oral agent (dual therapy) (is using the sliding scle with her meals now   Sheree Imam is too expensive). She is compliant with treatment most of the time. Her weight is decreasing steadily. Home blood sugar record trend: did not bring her log. An ACE inhibitor/angiotensin II receptor blocker is being taken (and statin).      Reviewed prior notes None  Reviewed previous  none    Hemoglobin A1C (%)   Date Value   06/28/2022 9.9   04/26/2022 14.0   06/04/2021 7.6             ( goal A1C is < 7)   No results found for: LABMICR  LDL Calculated (mg/dL)   Date Value   05/25/2022 59   01/12/2021 66   07/10/2020 141       (goal LDL is <100)   AST (U/L)   Date Value   05/25/2022 19     ALT (U/L)   Date Value   05/25/2022 29     BUN (mg/dL)   Date Value   05/25/2022 57 (H)     BP Readings from Last 3 Encounters:   08/01/22 (!) 142/82   06/28/22 (!) 142/84   04/26/22 (!) 142/86          (goal 140/90)    Past Medical History:   Diagnosis Date    Cancer ([facility] [address]     breast cancer    Hypertension Ischemic optic neuropathy     Type 2 diabetes mellitus without complication (HCC)         Social History     Tobacco Use    Smoking status: Former     Packs/day: 0.50     Years: 10.00     Pack years: 5.00     Types: Cigarettes     Quit date: 2018     Years since quittin.5    Smokeless tobacco: Never   Substance Use Topics    Alcohol use: Not Currently      Current Outpatient Medications   Medication Sig Dispense Refill    glimepiride (AMARYL) 2 MG tablet Take 1 tablet by mouth in the morning and 1 tablet in the evening. Take before meals. 60 tablet 2    budesonide-formoterol (SYMBICORT) 80-4.5 MCG/ACT AERO Inhale 2 puffs into the lungs in the morning and 2 puffs before bedtime. 10.2 g 3    blood glucose test strips (ASCENSIA AUTODISC VI;ONE TOUCH ULTRA TEST VI) strip Use with associated glucose meter. Use 6 times a day E11.9 180 strip 5    LEVEMIR FLEXTOUCH 100 UNIT/ML injection pen INJECT 20 UNITS INTO THE SKIN NIGHTLY 30 mL 1    NOVOLOG FLEXPEN 100 UNIT/ML injection pen PER SLIDING SCALE 15 mL 2    atorvastatin (LIPITOR) 40 MG tablet Take 1 tablet by mouth daily 30 tablet 11    lisinopril (PRINIVIL;ZESTRIL) 10 MG tablet Take 1 tablet by mouth daily 30 tablet 5    meclizine (ANTIVERT) 25 MG tablet TAKE ONE TABLET BY MOUTH THREE TIMES A DAY AS NEEDED FOR DIZZINESS 30 tablet 1    metoprolol tartrate (LOPRESSOR) 25 MG tablet       aspirin EC 81 MG EC tablet Take 1 tablet by mouth daily 90 tablet 1    docusate sodium (COLACE) 100 MG capsule TAKE 1 CAPSULE BY MOUTH DAILY AS NEEDED FOR CONSTIPATION 30 capsule 1     No current facility-administered medications for this visit.      No Known Allergies    Health Maintenance   Topic Date Due    COVID-19 Vaccine (1) Never done    DTaP/Tdap/Td vaccine (1 - Tdap) Never done    Shingles vaccine (1 of 2) Never done    Annual Wellness Visit (AWV)  2022    Flu vaccine (1) 2022    Depression Screen  2023    Pneumococcal 65+ years Vaccine  Completed Hepatitis A vaccine  Aged Out    Hib vaccine  Aged Out    Meningococcal (ACWY) vaccine  Aged Out       Subjective:     Review of Systems   Constitutional:  Negative for appetite change, chills, fatigue and fever. HENT:  Negative for dental problem, rhinorrhea, sinus pressure, sneezing and sore throat. Respiratory:  Negative for cough, chest tightness and shortness of breath. Cardiovascular:  Negative for chest pain, palpitations and leg swelling. Gastrointestinal:  Negative for abdominal pain, constipation, diarrhea, nausea and vomiting. Endocrine: Negative for polydipsia, polyphagia and polyuria. Genitourinary:  Negative for dysuria, frequency, hematuria and urgency. Skin:  Negative for color change and rash. Neurological:  Negative for dizziness, seizures, syncope, weakness, light-headedness, numbness and headaches. Hematological:  Negative for adenopathy. Psychiatric/Behavioral:  The patient is nervous/anxious. Objective:     BP (!) 142/82   Pulse 78   Ht 5' 2\" (1.575 m)   Wt 143 lb 9.6 oz (65.1 kg)   SpO2 100%   BMI 26.26 kg/m²   Physical Exam  Vitals reviewed. Neck:      Thyroid: No thyromegaly. Cardiovascular:      Rate and Rhythm: Normal rate and regular rhythm. Heart sounds: Normal heart sounds. Pulmonary:      Effort: Pulmonary effort is normal.      Breath sounds: Normal breath sounds. Neurological:      Mental Status: She is alert and oriented to person, place, and time. Assessment:       Diagnosis Orders   1. 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 Essential hypertension        3. Type 2 diabetes mellitus with hyperglycemia, without long-term current use of insulin ([facility])  glimepiride (AMARYL) 2 MG tablet           Plan:    REcheck BP  Get a life line to wear daily and nightly  Did review how to handle hypoglycemia  D/C Tradjenta  Take a bed time snack nightly   Sent strips to [place]. Return in about 3 months (around 11/1/2022) for Diabetes. Data Unavailable     No orders of the defined types were placed in this encounter. Orders Placed This Encounter   Medications    glimepiride (AMARYL) 2 MG tablet     Sig: Take 1 tablet by mouth in the morning and 1 tablet in the evening. Take before meals. Dispense:  60 tablet     Refill:  2    budesonide-formoterol (SYMBICORT) 80-4.5 MCG/ACT AERO     Sig: Inhale 2 puffs into the lungs in the morning and 2 puffs before bedtime. Dispense:  10.2 g     Refill:  3    blood glucose test strips (ASCENSIA AUTODISC VI;ONE TOUCH ULTRA TEST VI) strip     Sig: Use with associated glucose meter. Use 6 times a day E11.9     Dispense:  180 strip     Refill:  5         Patient given educational materials - see patient instructions. Discussed use, benefit, and side effects of prescribed medications. All patient questions answered. Pt voiced understanding. Reviewed health maintenance. Instructed to continue current medications, diet and exercise. Patient agreed with treatment plan. Follow up as directed.      Electronicallysigned by Donna Anaya PA-C on 8/1/2022 at 1:27 PM

## 2022-08-05 ENCOUNTER — TELEPHONE (OUTPATIENT)
Dept: PRIMARY CARE CLINIC | Age: 82
End: 2022-08-05

## 2022-08-05 RX ORDER — SULFAMETHOXAZOLE AND TRIMETHOPRIM 800; 160 MG/1; MG/1
1 TABLET ORAL 2 TIMES DAILY
Qty: 14 TABLET | Refills: 0 | Status: SHIPPED | OUTPATIENT
Start: 2022-08-05 | End: 2022-08-12

## 2022-08-05 NOTE — TELEPHONE ENCOUNTER
Pt's daughter Gaurav Nava calling. Pt was just here on 8/1/22. C/o urinary burning and thinks that she has a yeast infection, but couldn't really give me symptoms. Asking if you would send in an abx to [facility]?

## 2022-08-05 NOTE — TELEPHONE ENCOUNTER
If she has a yeast infection, would rather not start antibiotic that could make it worse unless absolutely needed.   Can she take her to a lab or come to office to give a specimen

## 2022-08-10 DIAGNOSIS — E11.41 DIABETIC MONONEUROPATHY ASSOCIATED WITH TYPE 2 DIABETES MELLITUS (HCC): ICD-10-CM

## 2022-08-10 RX ORDER — GABAPENTIN 100 MG/1
CAPSULE ORAL
Qty: 60 CAPSULE | Refills: 0 | OUTPATIENT
Start: 2022-08-10 | End: 2022-09-09

## 2022-08-13 DIAGNOSIS — E11.41 DIABETIC MONONEUROPATHY ASSOCIATED WITH TYPE 2 DIABETES MELLITUS (HCC): ICD-10-CM

## 2022-08-15 RX ORDER — GABAPENTIN 100 MG/1
CAPSULE ORAL
Qty: 60 CAPSULE | Refills: 0 | OUTPATIENT
Start: 2022-08-15 | End: 2022-09-14

## 2022-08-24 RX ORDER — INSULIN ASPART 100 [IU]/ML
INJECTION, SOLUTION INTRAVENOUS; SUBCUTANEOUS
Qty: 15 ML | Refills: 2 | Status: SHIPPED | OUTPATIENT
Start: 2022-08-24 | End: 2022-08-31

## 2022-08-31 RX ORDER — INSULIN ASPART 100 [IU]/ML
INJECTION, SOLUTION INTRAVENOUS; SUBCUTANEOUS
Qty: 15 ML | Refills: 2 | Status: SHIPPED | OUTPATIENT
Start: 2022-08-31

## 2022-08-31 RX ORDER — LISINOPRIL 10 MG/1
TABLET ORAL
Qty: 30 TABLET | Refills: 5 | Status: SHIPPED | OUTPATIENT
Start: 2022-08-31 | End: 2022-09-19

## 2022-08-31 RX ORDER — TRAZODONE HYDROCHLORIDE 50 MG/1
TABLET ORAL
Qty: 30 TABLET | Refills: 5 | OUTPATIENT
Start: 2022-08-31

## 2022-09-19 RX ORDER — LISINOPRIL 10 MG/1
TABLET ORAL
Qty: 30 TABLET | Refills: 1 | Status: SHIPPED | OUTPATIENT
Start: 2022-09-19 | End: 2022-11-14

## 2022-09-19 RX ORDER — TRAZODONE HYDROCHLORIDE 50 MG/1
TABLET ORAL
Qty: 30 TABLET | Refills: 1 | Status: SHIPPED | OUTPATIENT
Start: 2022-09-19 | End: 2022-10-26

## 2022-09-28 DIAGNOSIS — E11.65 TYPE 2 DIABETES MELLITUS WITH HYPERGLYCEMIA, WITHOUT LONG-TERM CURRENT USE OF INSULIN (HCC): ICD-10-CM

## 2022-09-28 RX ORDER — GLIMEPIRIDE 2 MG/1
TABLET ORAL
Qty: 60 TABLET | Refills: 2 | Status: SHIPPED | OUTPATIENT
Start: 2022-09-28 | End: 2022-10-26

## 2022-10-26 DIAGNOSIS — E11.65 TYPE 2 DIABETES MELLITUS WITH HYPERGLYCEMIA, WITHOUT LONG-TERM CURRENT USE OF INSULIN (HCC): ICD-10-CM

## 2022-10-26 RX ORDER — GLIMEPIRIDE 2 MG/1
TABLET ORAL
Qty: 60 TABLET | Refills: 0 | Status: SHIPPED | OUTPATIENT
Start: 2022-10-26 | End: 2022-12-08 | Stop reason: ALTCHOICE

## 2022-10-26 RX ORDER — TRAZODONE HYDROCHLORIDE 50 MG/1
TABLET ORAL
Qty: 30 TABLET | Refills: 5 | Status: SHIPPED | OUTPATIENT
Start: 2022-10-26

## 2022-10-31 ENCOUNTER — TELEPHONE (OUTPATIENT)
Dept: PRIMARY CARE CLINIC | Age: 82
End: 2022-10-31

## 2022-10-31 DIAGNOSIS — R30.0 DYSURIA: Primary | ICD-10-CM

## 2022-10-31 RX ORDER — CEPHALEXIN 500 MG/1
500 CAPSULE ORAL 3 TIMES DAILY
Qty: 21 CAPSULE | Refills: 0 | Status: SHIPPED | OUTPATIENT
Start: 2022-10-31

## 2022-10-31 NOTE — TELEPHONE ENCOUNTER
I will do this one more time, but in the future we will need at least a urine specimen and an Evisit.

## 2022-10-31 NOTE — TELEPHONE ENCOUNTER
Kiley Borrego called back and said that she does not have any Internet and cannot get her to her house.

## 2022-10-31 NOTE — TELEPHONE ENCOUNTER
Pt's daughter Ciera Vanegas calling. Asking if an abx can be sent in for her Mom? C/o urinary burning. Started about a week ago. No Temp. Said that they can't get her out of the house. Ciera Vanegas doesn't drive and the other sister has to work. Uses [facility].

## 2022-11-14 RX ORDER — LISINOPRIL 10 MG/1
TABLET ORAL
Qty: 30 TABLET | Refills: 0 | Status: SHIPPED | OUTPATIENT
Start: 2022-11-14 | End: 2022-11-28

## 2022-11-15 DIAGNOSIS — E78.5 HYPERLIPIDEMIA, UNSPECIFIED HYPERLIPIDEMIA TYPE: ICD-10-CM

## 2022-11-15 RX ORDER — ATORVASTATIN CALCIUM 40 MG/1
40 TABLET, FILM COATED ORAL DAILY
Qty: 30 TABLET | Refills: 11 | Status: SHIPPED | OUTPATIENT
Start: 2022-11-15

## 2022-11-17 ENCOUNTER — TELEPHONE (OUTPATIENT)
Dept: PRIMARY CARE CLINIC | Age: 82
End: 2022-11-17

## 2022-11-17 DIAGNOSIS — R30.0 DYSURIA: Primary | ICD-10-CM

## 2022-11-17 RX ORDER — ACETAMINOPHEN AND CODEINE PHOSPHATE 300; 30 MG/1; MG/1
1 TABLET ORAL EVERY 4 HOURS PRN
Qty: 18 TABLET | Refills: 0 | Status: SHIPPED | OUTPATIENT
Start: 2022-11-17 | End: 2022-11-20

## 2022-11-17 NOTE — TELEPHONE ENCOUNTER
Pts daughter stated that the nurse from [address] called and Michael has calmed down. She stated that she is just scared to eat or drink because pt is having so much pain when using the restroom that she is trying to avoid going. Pts daughter is asking if you can give her a tylenol 3 or something for pain to help Michael get through it. Katlyn Acuña.

## 2022-11-17 NOTE — TELEPHONE ENCOUNTER
Nery Lopez Patient's child is call stating the shirley went into [facility] on 11/11/22-11/16/22 . Was in for pain in her stomach , Dr at the e.r said it was low potassium . Patient wont eat or drink     Nery Lopez is stating that shirley is in a lot of pain ( private area ) and cant take care of herself and luzma has no idea what to do .

## 2022-11-17 NOTE — TELEPHONE ENCOUNTER
Sounds like Jayy Rodriguez should have been discharged to a subacute facility rather than go home on 11-16-22. If she is not eating or drinking and in a lot of pain, I would take her back to the hospital. When they are ready to discharge her, make sure she gets home care if she can come home or have her go for rehab to a care facility until she is able to take care of herself again.

## 2022-11-18 ENCOUNTER — TELEPHONE (OUTPATIENT)
Dept: PRIMARY CARE CLINIC | Age: 82
End: 2022-11-18

## 2022-11-18 NOTE — TELEPHONE ENCOUNTER
Care Transitions Initial Follow Up Call    Outreach made within 2 business days of discharge: Yes    Patient: Benny Adams Patient : 1940   MRN: [id number]  Reason for Admission: No discharge information exists for this patient. Discharge Date:  2022     Discharge department/facility: [facility] Interactive Patient Contact:  Was patient able to fill all prescriptions: Yes  Was patient instructed to bring all medications to the follow-up visit: Yes  Is patient taking all medications as directed in the discharge summary? Yes  Does patient understand their discharge instructions: Yes  Does patient have questions or concerns that need addressed prior to 7-14 day follow up office visit: yes - ABD PAIN     See telephone encounter with PCP on 22.    Appt scheduled     Scheduled appointment with PCP within 7-14 days    Follow Up  Future Appointments   Date Time Provider Joe Cherry   2022  9:30 AM [place]A [place]

## 2022-11-28 RX ORDER — LISINOPRIL 10 MG/1
TABLET ORAL
Qty: 30 TABLET | Refills: 0 | Status: SHIPPED | OUTPATIENT
Start: 2022-11-28

## 2022-12-08 ENCOUNTER — HOSPITAL ENCOUNTER (OUTPATIENT)
Age: 82
Setting detail: SPECIMEN
Discharge: HOME | End: 2022-12-08

## 2022-12-08 ENCOUNTER — OFFICE VISIT (OUTPATIENT)
Dept: PRIMARY CARE CLINIC | Age: 82
End: 2022-12-08
Payer: COMMERCIAL

## 2022-12-08 VITALS
DIASTOLIC BLOOD PRESSURE: 68 MMHG | WEIGHT: 144.6 LBS | OXYGEN SATURATION: 97 % | HEIGHT: 62 IN | SYSTOLIC BLOOD PRESSURE: 132 MMHG | HEART RATE: 96 BPM | BODY MASS INDEX: 26.61 KG/M2

## 2022-12-08 DIAGNOSIS — R31.9 HEMATURIA, UNSPECIFIED TYPE: ICD-10-CM

## 2022-12-08 DIAGNOSIS — N94.9 VAGINAL BURNING: ICD-10-CM

## 2022-12-08 DIAGNOSIS — R79.89 ELEVATED SERUM CREATININE: ICD-10-CM

## 2022-12-08 DIAGNOSIS — Z23 NEED FOR VACCINATION: ICD-10-CM

## 2022-12-08 DIAGNOSIS — Z79.4 TYPE 2 DIABETES MELLITUS WITH DIABETIC MONONEUROPATHY, WITH LONG-TERM CURRENT USE OF INSULIN (HCC): Primary | ICD-10-CM

## 2022-12-08 DIAGNOSIS — R30.0 DYSURIA: ICD-10-CM

## 2022-12-08 DIAGNOSIS — I10 ESSENTIAL HYPERTENSION: ICD-10-CM

## 2022-12-08 DIAGNOSIS — N17.9 AKI (ACUTE KIDNEY INJURY) (HCC): ICD-10-CM

## 2022-12-08 DIAGNOSIS — E11.41 TYPE 2 DIABETES MELLITUS WITH DIABETIC MONONEUROPATHY, WITH LONG-TERM CURRENT USE OF INSULIN (HCC): Primary | ICD-10-CM

## 2022-12-08 PROBLEM — E87.5 HYPERKALEMIA: Status: ACTIVE | Noted: 2022-11-11

## 2022-12-08 PROBLEM — D63.1 ANEMIA DUE TO STAGE 3B CHRONIC KIDNEY DISEASE (HCC): Status: ACTIVE | Noted: 2022-11-14

## 2022-12-08 PROBLEM — N18.32 ANEMIA DUE TO STAGE 3B CHRONIC KIDNEY DISEASE (HCC): Status: ACTIVE | Noted: 2022-11-14

## 2022-12-08 LAB
BILIRUBIN, POC: NEGATIVE
BLOOD URINE, POC: ABNORMAL
CHP ED QC CHECK: ABNORMAL
CLARITY, POC: ABNORMAL
COLOR, POC: ABNORMAL
CREATININE URINE POCT: ABNORMAL
GLUCOSE BLD-MCNC: 147 MG/DL
GLUCOSE URINE, POC: NEGATIVE
HBA1C MFR BLD: 6.9 %
KETONES, POC: NEGATIVE
LEUKOCYTE EST, POC: ABNORMAL
MICROALBUMIN/CREAT 24H UR: ABNORMAL MG/G{CREAT}
MICROALBUMIN/CREAT UR-RTO: ABNORMAL
NITRITE, POC: NEGATIVE
PH, POC: 6.5
PROTEIN, POC: ABNORMAL
SPECIFIC GRAVITY, POC: 1.02
UROBILINOGEN, POC: 0.2

## 2022-12-08 PROCEDURE — 82962 GLUCOSE BLOOD TEST: CPT | Performed by: PHYSICIAN ASSISTANT

## 2022-12-08 PROCEDURE — 1123F ACP DISCUSS/DSCN MKR DOCD: CPT | Performed by: PHYSICIAN ASSISTANT

## 2022-12-08 PROCEDURE — 90694 VACC AIIV4 NO PRSRV 0.5ML IM: CPT | Performed by: PHYSICIAN ASSISTANT

## 2022-12-08 PROCEDURE — 3044F HG A1C LEVEL LT 7.0%: CPT | Performed by: PHYSICIAN ASSISTANT

## 2022-12-08 PROCEDURE — 81003 URINALYSIS AUTO W/O SCOPE: CPT | Performed by: PHYSICIAN ASSISTANT

## 2022-12-08 PROCEDURE — 3078F DIAST BP <80 MM HG: CPT | Performed by: PHYSICIAN ASSISTANT

## 2022-12-08 PROCEDURE — 99215 OFFICE O/P EST HI 40 MIN: CPT | Performed by: PHYSICIAN ASSISTANT

## 2022-12-08 PROCEDURE — 82044 UR ALBUMIN SEMIQUANTITATIVE: CPT | Performed by: PHYSICIAN ASSISTANT

## 2022-12-08 PROCEDURE — 3074F SYST BP LT 130 MM HG: CPT | Performed by: PHYSICIAN ASSISTANT

## 2022-12-08 PROCEDURE — 83036 HEMOGLOBIN GLYCOSYLATED A1C: CPT | Performed by: PHYSICIAN ASSISTANT

## 2022-12-08 PROCEDURE — G0008 ADMIN INFLUENZA VIRUS VAC: HCPCS | Performed by: PHYSICIAN ASSISTANT

## 2022-12-08 RX ORDER — INSULIN LISPRO 100 [IU]/ML
INJECTION, SOLUTION INTRAVENOUS; SUBCUTANEOUS NIGHTLY
COMMUNITY
Start: 2022-11-16

## 2022-12-08 RX ORDER — SODIUM BICARBONATE 650 MG/1
TABLET ORAL 3 TIMES DAILY
COMMUNITY
Start: 2022-11-17

## 2022-12-08 RX ORDER — FLUCONAZOLE 150 MG/1
TABLET ORAL
Qty: 2 TABLET | Refills: 0 | Status: SHIPPED | OUTPATIENT
Start: 2022-12-08

## 2022-12-08 RX ORDER — BLOOD SUGAR DIAGNOSTIC
STRIP MISCELLANEOUS
Qty: 180 STRIP | Refills: 5 | Status: SHIPPED | OUTPATIENT
Start: 2022-12-08

## 2022-12-08 ASSESSMENT — ENCOUNTER SYMPTOMS
ABDOMINAL PAIN: 0
SINUS PRESSURE: 0
VOMITING: 0
CONSTIPATION: 0
DIARRHEA: 0
NAUSEA: 0
COUGH: 0
RHINORRHEA: 0
SHORTNESS OF BREATH: 0
SORE THROAT: 0
COLOR CHANGE: 0
CHEST TIGHTNESS: 0

## 2022-12-08 NOTE — PROGRESS NOTES
[address] [facility]  [address] [address]  Dept: Cinthya is a 80 y.o. female Established patient, who presents today for her medical conditions/complaints as noted below  Chief Complaint   Patient presents with    Diabetes     Follow up ( patient does check sugars at home )        HPI:     Diabetes  She presents for her follow-up diabetic visit. She has type 2 diabetes mellitus. Pertinent negatives for hypoglycemia include no dizziness, headaches or seizures. Pertinent negatives for diabetes include no chest pain, no fatigue, no polydipsia, no polyphagia, no polyuria and no weakness. Current diabetic treatment includes intensive insulin program and oral agent (monotherapy). She is compliant with treatment most of the time. Her weight is stable. She is following a generally healthy diet. Her breakfast blood glucose range is generally <70 mg/dl. Her dinner blood glucose range is generally 110-130 mg/dl. (Under 70 4/7 days) An ACE inhibitor/angiotensin II receptor blocker is being taken (and statin). Reviewed prior notes  hospital notes  She was never seen after hospitalization in November--discharged 11/16/22. Reviewed previous Hospital Records Was in hospital with hyperkalemia and JENNIFER following a acute cystitis with hematuria. She is using a water pill off and on. Still having burning with urination and notices a vaginal discharge. Otherwise feels fine. BP is high. Has not taken BP meds this morning yet.    Hemoglobin A1C (%)   Date Value   06/28/2022 9.9   04/26/2022 14.0   06/04/2021 7.6             ( goal A1C is < 7)   No results found for: LABMICR  LDL Calculated (mg/dL)   Date Value   05/25/2022 59   01/12/2021 66   07/10/2020 141       (goal LDL is <100)   AST (U/L)   Date Value   05/25/2022 19     ALT (U/L)   Date Value   05/25/2022 29     BUN (mg/dL)   Date Value   05/25/2022 57 (H)     BP Readings from Last 3 Encounters:   22 (!) 146/80   22 (!) 142/82   22 (!) 142/84          (goal 140/90)    Past Medical History:   Diagnosis Date    Cancer ([facility] [address]     breast cancer    Hypertension     Ischemic optic neuropathy     Type 2 diabetes mellitus without complication (HCC)         Social History     Tobacco Use    Smoking status: Former     Packs/day: 0.50     Years: 10.00     Pack years: 5.00     Types: Cigarettes     Quit date: 2018     Years since quittin.9    Smokeless tobacco: Never   Substance Use Topics    Alcohol use: Not Currently      Current Outpatient Medications   Medication Sig Dispense Refill    insulin lispro, 1 Unit Dial, (HUMALOG/ADMELOG) 100 UNIT/ML SOPN Inject into the skin nightly      sodium bicarbonate 650 MG tablet Take by mouth 3 times daily      fluconazole (DIFLUCAN) 150 MG tablet TAKE ONE TABLET NOW AND ONE IN 72 HOURS. 2 tablet 0    lisinopril (PRINIVIL;ZESTRIL) 10 MG tablet TAKE ONE TABLET BY MOUTH ONCE DAILY 30 tablet 0    atorvastatin (LIPITOR) 40 MG tablet TAKE 1 TABLET BY MOUTH DAILY 30 tablet 11    traZODone (DESYREL) 50 MG tablet TAKE ONE TABLET BY MOUTH NIGHTLY 30 tablet 5    NOVOLOG FLEXPEN 100 UNIT/ML injection pen INJECT SUBCUTANEOUS PER SLIDING SCALE 15 mL 2    budesonide-formoterol (SYMBICORT) 80-4.5 MCG/ACT AERO Inhale 2 puffs into the lungs in the morning and 2 puffs before bedtime. 10.2 g 3    blood glucose test strips (ASCENSIA AUTODISC VI;ONE TOUCH ULTRA TEST VI) strip Use with associated glucose meter.  Use 6 times a day E11.9 180 strip 5    LEVEMIR FLEXTOUCH 100 UNIT/ML injection pen INJECT 20 UNITS INTO THE SKIN NIGHTLY 30 mL 1    docusate sodium (COLACE) 100 MG capsule TAKE 1 CAPSULE BY MOUTH DAILY AS NEEDED FOR CONSTIPATION 30 capsule 1    meclizine (ANTIVERT) 25 MG tablet TAKE ONE TABLET BY MOUTH THREE TIMES A DAY AS NEEDED FOR DIZZINESS 30 tablet 1    metoprolol tartrate (LOPRESSOR) 25 MG tablet       aspirin EC 81 MG EC tablet Take 1 tablet by mouth daily 90 tablet 1     No current facility-administered medications for this visit. No Known Allergies    Health Maintenance   Topic Date Due    COVID-19 Vaccine (1) Never done    DTaP/Tdap/Td vaccine (1 - Tdap) Never done    Shingles vaccine (1 of 2) Never done    Flu vaccine (1) 08/01/2022    Annual Wellness Visit (AWV)  08/11/2022    Depression Screen  06/28/2023    Pneumococcal 65+ years Vaccine  Completed    Hepatitis A vaccine  Aged Out    Hib vaccine  Aged Out    Meningococcal (ACWY) vaccine  Aged Out       Subjective:     Review of Systems   Constitutional:  Negative for appetite change, chills, fatigue and fever. HENT:  Negative for dental problem, rhinorrhea, sinus pressure, sneezing and sore throat. Respiratory:  Negative for cough, chest tightness and shortness of breath. Cardiovascular:  Negative for chest pain, palpitations and leg swelling. Gastrointestinal:  Negative for abdominal pain, constipation, diarrhea, nausea and vomiting. Endocrine: Negative for polydipsia, polyphagia and polyuria. Genitourinary:  Negative for dysuria, frequency, hematuria and urgency. Skin:  Negative for color change and rash. Neurological:  Negative for dizziness, seizures, syncope, weakness, light-headedness, numbness and headaches. Hematological:  Negative for adenopathy. Objective:     BP (!) 146/80   Pulse (!) 106   Ht 5' 2\" (1.575 m)   Wt 144 lb 9.6 oz (65.6 kg)   SpO2 97%   BMI 26.45 kg/m²   Physical Exam  Vitals and nursing note reviewed. Constitutional:       Appearance: Normal appearance. She is not ill-appearing. Neck:      Thyroid: No thyromegaly. Cardiovascular:      Rate and Rhythm: Regular rhythm. Tachycardia present. Heart sounds: Normal heart sounds. Pulmonary:      Effort: Pulmonary effort is normal.      Breath sounds: Normal breath sounds. Abdominal:      General: Abdomen is flat. Bowel sounds are normal. There is no distension. Tenderness:  There is no abdominal tenderness. Musculoskeletal:      Right lower leg: No edema. Left lower leg: No edema. Neurological:      Mental Status: She is alert and oriented to person, place, and time. Psychiatric:         Mood and Affect: Mood normal.       Assessment:       Diagnosis Orders   1. 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 [address]  External Referral To Ophthalmology    POCT glycosylated hemoglobin (Hb A1C)    POCT microalbumin    POCT Glucose      2. Need for vaccination  Influenza, FLUAD, (age 72 y+), IM, Preservative Free, 0.5 mL      3. Essential hypertension  Basic Metabolic Panel      4. Dysuria  POCT Urinalysis No Micro (Auto)    MIKE Chauhan MD, Urology, [place]      5. Elevated serum creatinine        6. JENNIFER (acute kidney injury) ([facility] [address]  Basic Metabolic Panel      7. Hematuria, unspecified type  Culture, Urine    MIKE Chauhan MD, Urology, [facility]      8. Vaginal burning  fluconazole (DIFLUCAN) 150 MG tablet             Plan:    D/C glimipiride  Continue Levemir and Humalog  Monitor BPs  REcheck BP  REferral for diabetic eye exam  BW today or tomorrow to recheck kidney function  Sent urine for culture  Do not use diuretic  Diflucan for possible yeast infection with urinary burning  REferral to urology for hematuria       Return in about 3 months (around 3/8/2023) for Diabetes. Data Unavailable     Orders Placed This Encounter   Procedures    Culture, Urine     Standing Status:   Future     Standing Expiration Date:   12/8/2023     Order Specific Question:   Specify (ex-cath, midstream, cysto, etc)?      Answer:   midstream    Influenza, FLUAD, (age 72 y+), IM, Preservative Free, 0.5 mL    Basic Metabolic Panel     Standing Status:   Future     Standing Expiration Date:   12/8/2023    External Referral To Ophthalmology     Referral Priority:   Routine     Referral Type:   Eval and Treat     Referral Reason:   Specialty Services Required Referred to Provider:   Alexander Thomas MD     Requested Specialty:   Ophthalmology     Number of Visits Requested:   1    MIKE - Lexi Miranda MD, Urology, [place]     Referral Priority:   Routine     Referral Type:   Eval and Treat     Referral Reason:   Specialty Services Required     Referred to Provider:   Augusto Paredes MD     Requested Specialty:   Urology     Number of Visits Requested:   1    POCT Urinalysis No Micro (Auto)    POCT glycosylated hemoglobin (Hb A1C)    POCT microalbumin    POCT Glucose       Orders Placed This Encounter   Medications    fluconazole (DIFLUCAN) 150 MG tablet     Sig: TAKE ONE TABLET NOW AND ONE IN 72 HOURS. Dispense:  2 tablet     Refill:  0         Patient given educational materials - see patient instructions. Discussed use, benefit, and side effects of prescribed medications. All patient questions answered. Pt voiced understanding. Reviewed health maintenance. Instructed to continue current medications, diet and exercise. Patient agreed with treatment plan. Follow up as directed.      Electronicallysigned by Laith Vogel PA-C on 12/8/2022 at 10:24 AM

## 2022-12-09 DIAGNOSIS — R79.89 ELEVATED SERUM CREATININE: ICD-10-CM

## 2022-12-09 DIAGNOSIS — I10 ESSENTIAL HYPERTENSION: Primary | ICD-10-CM

## 2022-12-09 LAB
ANION GAP SERPL CALCULATED.3IONS-SCNC: 12 MEQ/L (ref 7–16)
BUN BLDV-MCNC: 64 MG/DL (ref 8–23)
CALCIUM SERPL-MCNC: 9.5 MG/DL (ref 8.5–10.5)
CHLORIDE BLD-SCNC: 103 MEQ/L (ref 95–107)
CO2: 24 MEQ/L (ref 19–31)
CREAT SERPL-MCNC: 2.4 MG/DL (ref 0.6–1.3)
CULTURE: NO GROWTH
EGFR IF NONAFRICAN AMERICAN: 20 ML/MIN/1.73
GLUCOSE: 164 MG/DL (ref 70–99)
POTASSIUM SERPL-SCNC: 5.4 MEQ/L (ref 3.5–5.4)
SODIUM BLD-SCNC: 139 MEQ/L (ref 133–146)
SPECIMEN DESCRIPTION: NORMAL

## 2022-12-10 ENCOUNTER — HOSPITAL ENCOUNTER (OUTPATIENT)
Age: 82
Discharge: HOME | End: 2022-12-10
Payer: COMMERCIAL

## 2022-12-10 DIAGNOSIS — R79.89 ELEVATED SERUM CREATININE: ICD-10-CM

## 2022-12-10 DIAGNOSIS — I10 ESSENTIAL HYPERTENSION: ICD-10-CM

## 2022-12-10 LAB
ANION GAP SERPL CALCULATED.3IONS-SCNC: 14 MMOL/L (ref 9–17)
BUN BLDV-MCNC: 70 MG/DL (ref 8–23)
CALCIUM SERPL-MCNC: 9 MG/DL (ref 8.6–10.4)
CHLORIDE BLD-SCNC: 105 MMOL/L (ref 98–107)
CO2: 22 MMOL/L (ref 20–31)
CREAT SERPL-MCNC: 2.55 MG/DL (ref 0.5–0.9)
GFR SERPL CREATININE-BSD FRML MDRD: 18 ML/MIN/1.73M2
GLUCOSE BLD-MCNC: 172 MG/DL (ref 70–99)
POTASSIUM SERPL-SCNC: 5.2 MMOL/L (ref 3.7–5.3)
SODIUM BLD-SCNC: 141 MMOL/L (ref 135–144)

## 2022-12-10 PROCEDURE — 36415 COLL VENOUS BLD VENIPUNCTURE: CPT

## 2022-12-10 PROCEDURE — 80048 BASIC METABOLIC PNL TOTAL CA: CPT

## 2022-12-12 DIAGNOSIS — R79.89 ELEVATED SERUM CREATININE: ICD-10-CM

## 2022-12-12 DIAGNOSIS — Z79.4 TYPE 2 DIABETES MELLITUS WITH DIABETIC MONONEUROPATHY, WITH LONG-TERM CURRENT USE OF INSULIN (HCC): ICD-10-CM

## 2022-12-12 DIAGNOSIS — I10 ESSENTIAL HYPERTENSION: Primary | ICD-10-CM

## 2022-12-12 DIAGNOSIS — R31.9 HEMATURIA, UNSPECIFIED TYPE: ICD-10-CM

## 2022-12-12 DIAGNOSIS — E11.41 TYPE 2 DIABETES MELLITUS WITH DIABETIC MONONEUROPATHY, WITH LONG-TERM CURRENT USE OF INSULIN (HCC): ICD-10-CM

## 2022-12-12 RX ORDER — AMLODIPINE BESYLATE 5 MG/1
5 TABLET ORAL DAILY
Qty: 30 TABLET | Refills: 3 | Status: SHIPPED | OUTPATIENT
Start: 2022-12-12

## 2022-12-15 RX ORDER — LISINOPRIL 10 MG/1
TABLET ORAL
Qty: 30 TABLET | Refills: 0 | OUTPATIENT
Start: 2022-12-15

## 2023-01-10 RX ORDER — LISINOPRIL 10 MG/1
TABLET ORAL
Qty: 30 TABLET | Refills: 0 | OUTPATIENT
Start: 2023-01-10

## 2023-01-18 RX ORDER — LISINOPRIL 10 MG/1
TABLET ORAL
Qty: 30 TABLET | Refills: 0 | OUTPATIENT
Start: 2023-01-18

## 2023-01-31 ENCOUNTER — TELEPHONE (OUTPATIENT)
Dept: PRIMARY CARE CLINIC | Age: 83
End: 2023-01-31

## 2023-01-31 NOTE — TELEPHONE ENCOUNTER
[address] [facility] living PT called to aware Burak Ellis of patient bp. 182/83 patient state she forgot to take bp medication today.  Nurse from [facility] will be out to patient home later today for visit     [facility] [phone]

## 2023-02-10 ENCOUNTER — TELEPHONE (OUTPATIENT)
Dept: PRIMARY CARE CLINIC | Age: 83
End: 2023-02-10

## 2023-02-10 NOTE — TELEPHONE ENCOUNTER
Care Transitions Initial Follow Up Call    Outreach made within 2 business days of discharge: Yes    Patient: Samira Ga Patient : 1940   MRN: [id number]  Reason for Admission: No discharge information exists for this patient. Discharge Date:         Spoke with: NO ANSWER    Discharge department/facility: [facility]    NO ANSWER, LMOM TO CALL OFFICE BACK     Follow Up  No future appointments.     Macrina Faith MA

## 2023-02-14 ENCOUNTER — TELEPHONE (OUTPATIENT)
Dept: PRIMARY CARE CLINIC | Age: 83
End: 2023-02-14

## 2023-02-16 RX ORDER — LISINOPRIL 10 MG/1
TABLET ORAL
Qty: 30 TABLET | Refills: 0 | OUTPATIENT
Start: 2023-02-16

## 2023-02-22 DIAGNOSIS — I10 ESSENTIAL HYPERTENSION: ICD-10-CM

## 2023-02-22 RX ORDER — AMLODIPINE BESYLATE 5 MG/1
TABLET ORAL
Qty: 30 TABLET | Refills: 0 | Status: SHIPPED | OUTPATIENT
Start: 2023-02-22 | End: 2023-03-06 | Stop reason: SDUPTHER

## 2023-03-06 ENCOUNTER — HOSPITAL ENCOUNTER (OUTPATIENT)
Age: 83
Setting detail: SPECIMEN
Discharge: HOME | End: 2023-03-06

## 2023-03-06 ENCOUNTER — OFFICE VISIT (OUTPATIENT)
Dept: PRIMARY CARE CLINIC | Age: 83
End: 2023-03-06
Payer: COMMERCIAL

## 2023-03-06 VITALS
BODY MASS INDEX: 27.23 KG/M2 | DIASTOLIC BLOOD PRESSURE: 76 MMHG | HEIGHT: 62 IN | WEIGHT: 148 LBS | HEART RATE: 98 BPM | OXYGEN SATURATION: 98 % | SYSTOLIC BLOOD PRESSURE: 130 MMHG

## 2023-03-06 DIAGNOSIS — Z79.4 TYPE 2 DIABETES MELLITUS WITH DIABETIC MONONEUROPATHY, WITH LONG-TERM CURRENT USE OF INSULIN (HCC): ICD-10-CM

## 2023-03-06 DIAGNOSIS — I10 ESSENTIAL HYPERTENSION: ICD-10-CM

## 2023-03-06 DIAGNOSIS — E78.5 HYPERLIPIDEMIA, UNSPECIFIED HYPERLIPIDEMIA TYPE: ICD-10-CM

## 2023-03-06 DIAGNOSIS — F51.02 ADJUSTMENT INSOMNIA: ICD-10-CM

## 2023-03-06 DIAGNOSIS — J44.1 CHRONIC OBSTRUCTIVE PULMONARY DISEASE WITH (ACUTE) EXACERBATION (HCC): Primary | ICD-10-CM

## 2023-03-06 DIAGNOSIS — N18.9 ANEMIA IN CHRONIC KIDNEY DISEASE, UNSPECIFIED CKD STAGE: ICD-10-CM

## 2023-03-06 DIAGNOSIS — D63.1 ANEMIA IN CHRONIC KIDNEY DISEASE, UNSPECIFIED CKD STAGE: ICD-10-CM

## 2023-03-06 DIAGNOSIS — R31.21 ASYMPTOMATIC MICROSCOPIC HEMATURIA: ICD-10-CM

## 2023-03-06 DIAGNOSIS — N13.70 BILATERAL URETERAL REFLUX: ICD-10-CM

## 2023-03-06 DIAGNOSIS — E11.41 TYPE 2 DIABETES MELLITUS WITH DIABETIC MONONEUROPATHY, WITH LONG-TERM CURRENT USE OF INSULIN (HCC): ICD-10-CM

## 2023-03-06 DIAGNOSIS — I11.0 HYPERTENSIVE HEART DISEASE WITH HEART FAILURE (HCC): ICD-10-CM

## 2023-03-06 DIAGNOSIS — E11.41 DIABETIC MONONEUROPATHY ASSOCIATED WITH TYPE 2 DIABETES MELLITUS (HCC): ICD-10-CM

## 2023-03-06 PROBLEM — N18.4 CKD (CHRONIC KIDNEY DISEASE) STAGE 4, GFR 15-29 ML/MIN (HCC): Status: ACTIVE | Noted: 2023-01-03

## 2023-03-06 LAB
BILIRUBIN, POC: NORMAL
BLOOD URINE, POC: NORMAL
CLARITY, POC: NORMAL
COLOR, POC: NORMAL
GLUCOSE URINE, POC: NORMAL
KETONES, POC: NORMAL
LEUKOCYTE EST, POC: NORMAL
NITRITE, POC: NORMAL
PH, POC: 5.5
PROTEIN, POC: >=300
SPECIFIC GRAVITY, POC: 1.02
UROBILINOGEN, POC: 0.2

## 2023-03-06 PROCEDURE — 81003 URINALYSIS AUTO W/O SCOPE: CPT | Performed by: PHYSICIAN ASSISTANT

## 2023-03-06 PROCEDURE — 99215 OFFICE O/P EST HI 40 MIN: CPT | Performed by: PHYSICIAN ASSISTANT

## 2023-03-06 PROCEDURE — 1123F ACP DISCUSS/DSCN MKR DOCD: CPT | Performed by: PHYSICIAN ASSISTANT

## 2023-03-06 PROCEDURE — 3078F DIAST BP <80 MM HG: CPT | Performed by: PHYSICIAN ASSISTANT

## 2023-03-06 PROCEDURE — 3075F SYST BP GE 130 - 139MM HG: CPT | Performed by: PHYSICIAN ASSISTANT

## 2023-03-06 RX ORDER — INSULIN DETEMIR 100 [IU]/ML
10 INJECTION, SOLUTION SUBCUTANEOUS 2 TIMES DAILY
Qty: 30 ML | Refills: 1
Start: 2023-03-06

## 2023-03-06 RX ORDER — TRAZODONE HYDROCHLORIDE 50 MG/1
50 TABLET ORAL NIGHTLY
Qty: 30 TABLET | Refills: 11 | Status: SHIPPED | OUTPATIENT
Start: 2023-03-06

## 2023-03-06 RX ORDER — AMLODIPINE BESYLATE 5 MG/1
5 TABLET ORAL DAILY
Qty: 30 TABLET | Refills: 11 | Status: SHIPPED | OUTPATIENT
Start: 2023-03-06

## 2023-03-06 RX ORDER — SODIUM BICARBONATE 650 MG/1
TABLET ORAL 2 TIMES DAILY
Qty: 60 TABLET | Refills: 11 | Status: SHIPPED | COMMUNITY
Start: 2023-03-06

## 2023-03-06 RX ORDER — GLIMEPIRIDE 4 MG/1
TABLET ORAL
COMMUNITY
Start: 2023-01-23 | End: 2023-03-06 | Stop reason: ALTCHOICE

## 2023-03-06 RX ORDER — FUROSEMIDE 40 MG/1
40 TABLET ORAL DAILY
Qty: 30 TABLET | Refills: 11 | Status: SHIPPED | OUTPATIENT
Start: 2023-03-06

## 2023-03-06 RX ORDER — LANOLIN ALCOHOL/MO/W.PET/CERES
325 CREAM (GRAM) TOPICAL 2 TIMES DAILY
COMMUNITY
Start: 2023-01-04 | End: 2023-03-06 | Stop reason: SDUPTHER

## 2023-03-06 RX ORDER — GABAPENTIN 100 MG/1
100 CAPSULE ORAL 2 TIMES DAILY
Qty: 60 CAPSULE | Refills: 0 | Status: SHIPPED | OUTPATIENT
Start: 2023-03-06 | End: 2023-04-05

## 2023-03-06 RX ORDER — LISINOPRIL 10 MG/1
TABLET ORAL
COMMUNITY
Start: 2022-12-12 | End: 2023-03-06 | Stop reason: ALTCHOICE

## 2023-03-06 RX ORDER — LANOLIN ALCOHOL/MO/W.PET/CERES
325 CREAM (GRAM) TOPICAL 2 TIMES DAILY
Qty: 60 TABLET | Refills: 11 | Status: SHIPPED | OUTPATIENT
Start: 2023-03-06

## 2023-03-06 RX ORDER — ATORVASTATIN CALCIUM 40 MG/1
40 TABLET, FILM COATED ORAL DAILY
Qty: 30 TABLET | Refills: 11 | Status: SHIPPED | OUTPATIENT
Start: 2023-03-06

## 2023-03-06 RX ORDER — INSULIN LISPRO 100 [IU]/ML
5 INJECTION, SOLUTION INTRAVENOUS; SUBCUTANEOUS
Qty: 5 ADJUSTABLE DOSE PRE-FILLED PEN SYRINGE | Refills: 5 | Status: SHIPPED | OUTPATIENT
Start: 2023-03-06

## 2023-03-06 ASSESSMENT — ENCOUNTER SYMPTOMS
SHORTNESS OF BREATH: 0
ABDOMINAL PAIN: 0
BACK PAIN: 0

## 2023-03-06 ASSESSMENT — PATIENT HEALTH QUESTIONNAIRE - PHQ9
SUM OF ALL RESPONSES TO PHQ QUESTIONS 1-9: 0
SUM OF ALL RESPONSES TO PHQ9 QUESTIONS 1 & 2: 0
SUM OF ALL RESPONSES TO PHQ QUESTIONS 1-9: 0
1. LITTLE INTEREST OR PLEASURE IN DOING THINGS: 0
2. FEELING DOWN, DEPRESSED OR HOPELESS: 0

## 2023-03-06 NOTE — PROGRESS NOTES
[facility] PRIMARY CARE  [address] [place] [id number]  Dept: Cinthya is a 80 y.o. female who presents today for her medical conditions/complaints as noted below. Chief Complaint   Patient presents with    Post-Op Check     Patient had a cystoscopy done 23. Patient said she is feeling better       HPI:     HPI  Was in the hospital again for urinary retention with hydronephrosis and hyperkalemia. Never had f/u with me. Apparently never had home care to teach her how to handle her indwelling cath. Saw urology on 23 in follow-up from a cystoscopy. She reports not taking many of her meds? ??    Sugars are running ok.       NURSE COMES 1-2 WEEK  LDL Calculated (mg/dL)   Date Value   2022 59   2021 66   07/10/2020 141       (goal LDL is <100)   AST (U/L)   Date Value   2022 19     ALT (U/L)   Date Value   2022 29     BUN (mg/dL)   Date Value   12/10/2022 70 (H)     BP Readings from Last 3 Encounters:   23 130/76   22 132/68   22 (!) 142/82          (goal 120/80)    Past Medical History:   Diagnosis Date    Cancer ([facility] [address]     breast cancer    Hypertension     Ischemic optic neuropathy     Type 2 diabetes mellitus without complication ([facility] [address]       Past Surgical History:   Procedure Laterality Date    BACK SURGERY      BREAST SURGERY      lumpectomy left    HYSTERECTOMY, VAGINAL         Family History   Problem Relation Age of Onset    Heart Attack Father     Diabetes type 2  Daughter        Social History     Tobacco Use    Smoking status: Former     Packs/day: 0.50     Years: 10.00     Pack years: 5.00     Types: Cigarettes     Quit date: 2018     Years since quittin.1    Smokeless tobacco: Never   Substance Use Topics    Alcohol use: Not Currently      Current Outpatient Medications   Medication Sig Dispense Refill    sodium bicarbonate 650 MG tablet Take by mouth 2 times daily 60 tablet 11    metoprolol tartrate (LOPRESSOR) 25 MG tablet Take 1 tablet by mouth 2 times daily 60 tablet 5    gabapentin (NEURONTIN) 100 MG capsule Take 1 capsule by mouth 2 times daily for 30 days. Intended supply: 30 days 60 capsule 0    atorvastatin (LIPITOR) 40 MG tablet Take 1 tablet by mouth daily 30 tablet 11    amLODIPine (NORVASC) 5 MG tablet Take 1 tablet by mouth daily 30 tablet 11    ferrous sulfate (FE TABS 325) 325 (65 Fe) MG EC tablet Take 1 tablet by mouth 2 times daily 60 tablet 11    furosemide (LASIX) 40 MG tablet Take 1 tablet by mouth daily 30 tablet 11    traZODone (DESYREL) 50 MG tablet Take 1 tablet by mouth nightly 30 tablet 11    insulin detemir (LEVEMIR FLEXTOUCH) 100 UNIT/ML injection pen Inject 10 Units into the skin 2 times daily 30 mL 1    insulin lispro, 1 Unit Dial, (HUMALOG/ADMELOG) 100 UNIT/ML SOPN Inject 5 Units into the skin 3 times daily (before meals) 5 Adjustable Dose Pre-filled Pen Syringe 5    blood glucose test strips (FREESTYLE PRECISION ARLENE TEST) strip USE 6 TIMES A  strip 5    budesonide-formoterol (SYMBICORT) 80-4.5 MCG/ACT AERO Inhale 2 puffs into the lungs in the morning and 2 puffs before bedtime. 10.2 g 3    docusate sodium (COLACE) 100 MG capsule TAKE 1 CAPSULE BY MOUTH DAILY AS NEEDED FOR CONSTIPATION 30 capsule 1    aspirin EC 81 MG EC tablet Take 1 tablet by mouth daily 90 tablet 1     No current facility-administered medications for this visit.      No Known Allergies    Health Maintenance   Topic Date Due    COVID-19 Vaccine (1) Never done    DTaP/Tdap/Td vaccine (1 - Tdap) Never done    Shingles vaccine (1 of 2) Never done    Annual Wellness Visit (AWV)  08/11/2022    Depression Screen  06/28/2023    Flu vaccine  Completed    Pneumococcal 65+ years Vaccine  Completed    Hepatitis A vaccine  Aged Out    Hib vaccine  Aged Out    Meningococcal (ACWY) vaccine  Aged Out       Subjective:      Review of Systems   Constitutional:  Negative for chills, diaphoresis and fever. Respiratory:  Negative for shortness of breath. Cardiovascular:  Positive for leg swelling. Negative for chest pain. Gastrointestinal:  Negative for abdominal pain. Genitourinary:  Negative for flank pain and hematuria. Musculoskeletal:  Negative for back pain. Neurological:  Negative for dizziness, light-headedness and headaches. Objective:     /76   Pulse 98   Ht 5' 2\" (1.575 m)   Wt 148 lb (67.1 kg)   SpO2 98%   BMI 27.07 kg/m²   Physical Exam  Vitals and nursing note reviewed. Constitutional:       Appearance: Normal appearance. Cardiovascular:      Rate and Rhythm: Normal rate and regular rhythm. Heart sounds: Normal heart sounds. Pulmonary:      Effort: Pulmonary effort is normal.      Breath sounds: Normal breath sounds. Neurological:      Mental Status: She is alert and oriented to person, place, and time. Psychiatric:         Mood and Affect: Mood normal.       Assessment:       Diagnosis Orders   1. Chronic obstructive pulmonary disease with (acute) exacerbation (HCC)        2. Bilateral ureteral reflux  POCT Urinalysis No Micro (Auto)      3. Hyperlipidemia, unspecified hyperlipidemia type  atorvastatin (LIPITOR) 40 MG tablet      4. Essential hypertension  metoprolol tartrate (LOPRESSOR) 25 MG tablet    amLODIPine (NORVASC) 5 MG tablet      5. 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 Hypertensive heart disease with heart failure (HCC)  furosemide (LASIX) 40 MG tablet    MIKE - Demian Newberry MD, Cardiology, [facility]      7. Diabetic mononeuropathy associated with type 2 diabetes mellitus ([facility] [address]        8.  Anemia in chronic kidney disease, unspecified CKD stage  ferrous sulfate (FE TABS 325) 325 (65 Fe) MG EC tablet      9. Adjustment insomnia  traZODone (DESYREL) 50 MG tablet           Plan:    Home Care still coming 1-2 times a week. She can not remember who. Will send for pill packs to [facility]  Did talk to her about considering home care  Urine today   Referral to cardiology  Send urine for culture with hematuria  Return in about 1 month (around 4/6/2023) for Diabetes. Orders Placed This Encounter   Procedures    Gene Jung MD, Cardiology, [place]     Referral Priority:   Routine     Referral Type:   Eval and Treat     Referral Reason:   Specialty Services Required     Referred to Provider:   Jeremiah Garcia MD     Requested Specialty:   Cardiology     Number of Visits Requested:   1    POCT Urinalysis No Micro (Auto)       Orders Placed This Encounter   Medications    metoprolol tartrate (LOPRESSOR) 25 MG tablet     Sig: Take 1 tablet by mouth 2 times daily     Dispense:  60 tablet     Refill:  5    gabapentin (NEURONTIN) 100 MG capsule     Sig: Take 1 capsule by mouth 2 times daily for 30 days.  Intended supply: 30 days     Dispense:  60 capsule     Refill:  0    atorvastatin (LIPITOR) 40 MG tablet     Sig: Take 1 tablet by mouth daily     Dispense:  30 tablet     Refill:  11    amLODIPine (NORVASC) 5 MG tablet     Sig: Take 1 tablet by mouth daily     Dispense:  30 tablet     Refill:  11    ferrous sulfate (FE TABS 325) 325 (65 Fe) MG EC tablet     Sig: Take 1 tablet by mouth 2 times daily     Dispense:  60 tablet     Refill:  11    furosemide (LASIX) 40 MG tablet     Sig: Take 1 tablet by mouth daily     Dispense:  30 tablet     Refill:  11    traZODone (DESYREL) 50 MG tablet     Sig: Take 1 tablet by mouth nightly     Dispense:  30 tablet     Refill:  11    insulin detemir (LEVEMIR FLEXTOUCH) 100 UNIT/ML injection pen     Sig: Inject 10 Units into the skin 2 times daily     Dispense:  30 mL     Refill:  1    insulin lispro, 1 Unit Dial, (HUMALOG/ADMELOG) 100 UNIT/ML SOPN     Sig: Inject 5 Units into the skin 3 times daily (before meals)     Dispense:  5 Adjustable Dose Pre-filled Pen Syringe     Refill:  5         Patient given educational materials - see patient instructions. Discussed use, benefit, and side effects of prescribed medications. All patient questions answered. Pt voiced understanding. Reviewed health maintenance. Instructed to continue current medications, diet and exercise. Patient agreed with treatment plan. Follow up as directed.      Electronically signed by Regan Topete PA-C on 3/6/2023 at 2:29 PM

## 2023-03-07 DIAGNOSIS — E11.65 TYPE 2 DIABETES MELLITUS WITH HYPERGLYCEMIA, WITH LONG-TERM CURRENT USE OF INSULIN (HCC): ICD-10-CM

## 2023-03-07 DIAGNOSIS — Z79.4 TYPE 2 DIABETES MELLITUS WITH DIABETIC MONONEUROPATHY, WITH LONG-TERM CURRENT USE OF INSULIN (HCC): Primary | ICD-10-CM

## 2023-03-07 DIAGNOSIS — E11.41 TYPE 2 DIABETES MELLITUS WITH DIABETIC MONONEUROPATHY, WITH LONG-TERM CURRENT USE OF INSULIN (HCC): Primary | ICD-10-CM

## 2023-03-07 DIAGNOSIS — Z79.4 TYPE 2 DIABETES MELLITUS WITH HYPERGLYCEMIA, WITH LONG-TERM CURRENT USE OF INSULIN (HCC): ICD-10-CM

## 2023-03-07 LAB
MICROORGANISM SPEC CULT: NORMAL
SPECIMEN DESCRIPTION: NORMAL

## 2023-04-18 ENCOUNTER — OFFICE VISIT (OUTPATIENT)
Dept: PRIMARY CARE CLINIC | Age: 83
End: 2023-04-18
Payer: COMMERCIAL

## 2023-04-18 VITALS
OXYGEN SATURATION: 96 % | DIASTOLIC BLOOD PRESSURE: 68 MMHG | WEIGHT: 148 LBS | HEIGHT: 62 IN | BODY MASS INDEX: 27.23 KG/M2 | SYSTOLIC BLOOD PRESSURE: 132 MMHG | HEART RATE: 81 BPM

## 2023-04-18 DIAGNOSIS — I10 ESSENTIAL HYPERTENSION: ICD-10-CM

## 2023-04-18 DIAGNOSIS — Z79.4 TYPE 2 DIABETES MELLITUS WITH DIABETIC MONONEUROPATHY, WITH LONG-TERM CURRENT USE OF INSULIN (HCC): Primary | ICD-10-CM

## 2023-04-18 DIAGNOSIS — E11.41 TYPE 2 DIABETES MELLITUS WITH DIABETIC MONONEUROPATHY, WITH LONG-TERM CURRENT USE OF INSULIN (HCC): Primary | ICD-10-CM

## 2023-04-18 PROBLEM — R33.9 RETENTION OF URINE: Status: ACTIVE | Noted: 2021-10-19

## 2023-04-18 PROBLEM — N13.39 OTHER HYDRONEPHROSIS: Status: ACTIVE | Noted: 2023-03-06

## 2023-04-18 LAB — HBA1C MFR BLD: 8.1 %

## 2023-04-18 PROCEDURE — 3075F SYST BP GE 130 - 139MM HG: CPT | Performed by: PHYSICIAN ASSISTANT

## 2023-04-18 PROCEDURE — 3078F DIAST BP <80 MM HG: CPT | Performed by: PHYSICIAN ASSISTANT

## 2023-04-18 PROCEDURE — 1123F ACP DISCUSS/DSCN MKR DOCD: CPT | Performed by: PHYSICIAN ASSISTANT

## 2023-04-18 PROCEDURE — 99214 OFFICE O/P EST MOD 30 MIN: CPT | Performed by: PHYSICIAN ASSISTANT

## 2023-04-18 PROCEDURE — 83036 HEMOGLOBIN GLYCOSYLATED A1C: CPT | Performed by: PHYSICIAN ASSISTANT

## 2023-04-18 RX ORDER — LISINOPRIL 5 MG/1
5 TABLET ORAL DAILY
Qty: 90 TABLET | Refills: 1 | Status: SHIPPED | OUTPATIENT
Start: 2023-04-18

## 2023-04-18 ASSESSMENT — ENCOUNTER SYMPTOMS
CONSTIPATION: 0
COUGH: 0
SHORTNESS OF BREATH: 0
VOMITING: 0
COLOR CHANGE: 0
WHEEZING: 0
RHINORRHEA: 0
EYE DISCHARGE: 0
CHEST TIGHTNESS: 0
ABDOMINAL PAIN: 0
NAUSEA: 0
DIARRHEA: 0
SORE THROAT: 0
SINUS PRESSURE: 0
EYE REDNESS: 0

## 2023-04-18 NOTE — PROGRESS NOTES
[address] [facility] PRIMARY CARE  [address] [place] [id number]  Dept: Cinthya is a 80 y.o. female who presents today for her medical conditions/complaints as noted below. Chief Complaint   Patient presents with    Diabetes     Patient said her fasting sugar this morning was 200 and 400 the other other day in the evening. HPI:     HPI  Levemir 10U qhs and Humalog 3 times daily before meals.     Dr. Mynor Downs in February took catherer out and next appt if 23    LDL Calculated (mg/dL)   Date Value   2022 59   2021 66   07/10/2020 141       (goal LDL is <100)   AST (U/L)   Date Value   2022 19     ALT (U/L)   Date Value   2022 29     BUN (mg/dL)   Date Value   12/10/2022 70 (H)     BP Readings from Last 3 Encounters:   23 132/68   23 130/76   22 132/68          (goal 120/80)    Past Medical History:   Diagnosis Date    Cancer ([facility] [address]     breast cancer    Hypertension     Ischemic optic neuropathy     Type 2 diabetes mellitus without complication ([facility] [address]       Past Surgical History:   Procedure Laterality Date    BACK SURGERY      BREAST SURGERY      lumpectomy left    HYSTERECTOMY, VAGINAL         Family History   Problem Relation Age of Onset    Heart Attack Father     Diabetes type 2  Daughter        Social History     Tobacco Use    Smoking status: Former     Packs/day: 0.50     Years: 10.00     Pack years: 5.00     Types: Cigarettes     Quit date: 2018     Years since quittin.2    Smokeless tobacco: Never   Substance Use Topics    Alcohol use: Not Currently      Current Outpatient Medications   Medication Sig Dispense Refill    insulin detemir (LEVEMIR FLEXTOUCH) 100 UNIT/ML injection pen Inject 15 Units into the skin every evening 30 mL 1    lisinopril (PRINIVIL;ZESTRIL) 5 MG tablet Take 1 tablet by mouth daily 90 tablet 1    gabapentin (NEURONTIN) 100 MG capsule TAKE ONE CAPSULE BY MOUTH TWICE

## 2023-04-19 DIAGNOSIS — E11.41 TYPE 2 DIABETES MELLITUS WITH DIABETIC MONONEUROPATHY, WITH LONG-TERM CURRENT USE OF INSULIN (HCC): ICD-10-CM

## 2023-04-19 DIAGNOSIS — I10 ESSENTIAL HYPERTENSION: ICD-10-CM

## 2023-04-19 DIAGNOSIS — N18.4 CHRONIC RENAL DISEASE, STAGE 4, SEVERELY DECREASED GLOMERULAR FILTRATION RATE (GFR) BETWEEN 15-29 ML/MIN/1.73 SQUARE METER (HCC): Primary | ICD-10-CM

## 2023-04-19 DIAGNOSIS — Z79.4 TYPE 2 DIABETES MELLITUS WITH DIABETIC MONONEUROPATHY, WITH LONG-TERM CURRENT USE OF INSULIN (HCC): ICD-10-CM

## 2023-04-19 LAB
ANION GAP SERPL CALCULATED.3IONS-SCNC: 17 MEQ/L (ref 7–16)
BUN BLDV-MCNC: 56 MG/DL (ref 8–23)
CALCIUM SERPL-MCNC: 9.1 MG/DL (ref 8.5–10.5)
CHLORIDE BLD-SCNC: 100 MEQ/L (ref 95–107)
CO2: 25 MEQ/L (ref 19–31)
CREAT SERPL-MCNC: 2.23 MG/DL (ref 0.6–1.3)
EGFR IF NONAFRICAN AMERICAN: 21 ML/MIN/1.73
GLUCOSE: 274 MG/DL (ref 70–99)
POTASSIUM SERPL-SCNC: 4.2 MEQ/L (ref 3.5–5.4)
SODIUM BLD-SCNC: 142 MEQ/L (ref 133–146)

## 2023-04-19 RX ORDER — PIOGLITAZONEHYDROCHLORIDE 15 MG/1
15 TABLET ORAL DAILY
Qty: 30 TABLET | Refills: 3 | Status: SHIPPED | OUTPATIENT
Start: 2023-04-19

## 2023-05-08 DIAGNOSIS — Z79.4 TYPE 2 DIABETES MELLITUS WITH DIABETIC MONONEUROPATHY, WITH LONG-TERM CURRENT USE OF INSULIN (HCC): ICD-10-CM

## 2023-05-08 DIAGNOSIS — E11.41 TYPE 2 DIABETES MELLITUS WITH DIABETIC MONONEUROPATHY, WITH LONG-TERM CURRENT USE OF INSULIN (HCC): ICD-10-CM

## 2023-05-08 RX ORDER — GABAPENTIN 100 MG/1
CAPSULE ORAL
Qty: 60 CAPSULE | Refills: 0 | Status: SHIPPED | OUTPATIENT
Start: 2023-05-08 | End: 2023-06-07

## 2023-05-25 ENCOUNTER — OFFICE VISIT (OUTPATIENT)
Dept: PRIMARY CARE CLINIC | Age: 83
End: 2023-05-25
Payer: COMMERCIAL

## 2023-05-25 VITALS
OXYGEN SATURATION: 95 % | WEIGHT: 145 LBS | HEIGHT: 62 IN | DIASTOLIC BLOOD PRESSURE: 70 MMHG | HEART RATE: 65 BPM | BODY MASS INDEX: 26.68 KG/M2 | SYSTOLIC BLOOD PRESSURE: 132 MMHG

## 2023-05-25 DIAGNOSIS — H54.8 LEGALLY BLIND IN LEFT EYE, AS DEFINED IN USA: ICD-10-CM

## 2023-05-25 DIAGNOSIS — I10 ESSENTIAL HYPERTENSION: ICD-10-CM

## 2023-05-25 DIAGNOSIS — E11.41 DIABETIC MONONEUROPATHY ASSOCIATED WITH TYPE 2 DIABETES MELLITUS (HCC): ICD-10-CM

## 2023-05-25 DIAGNOSIS — H91.93 HEARING PROBLEM OF BOTH EARS: ICD-10-CM

## 2023-05-25 DIAGNOSIS — Z79.4 TYPE 2 DIABETES MELLITUS WITH DIABETIC MONONEUROPATHY, WITH LONG-TERM CURRENT USE OF INSULIN (HCC): Primary | ICD-10-CM

## 2023-05-25 DIAGNOSIS — N18.4 CKD (CHRONIC KIDNEY DISEASE) STAGE 4, GFR 15-29 ML/MIN (HCC): ICD-10-CM

## 2023-05-25 DIAGNOSIS — E11.41 TYPE 2 DIABETES MELLITUS WITH DIABETIC MONONEUROPATHY, WITH LONG-TERM CURRENT USE OF INSULIN (HCC): Primary | ICD-10-CM

## 2023-05-25 PROCEDURE — 3078F DIAST BP <80 MM HG: CPT | Performed by: PHYSICIAN ASSISTANT

## 2023-05-25 PROCEDURE — 3075F SYST BP GE 130 - 139MM HG: CPT | Performed by: PHYSICIAN ASSISTANT

## 2023-05-25 PROCEDURE — 1123F ACP DISCUSS/DSCN MKR DOCD: CPT | Performed by: PHYSICIAN ASSISTANT

## 2023-05-25 PROCEDURE — 99213 OFFICE O/P EST LOW 20 MIN: CPT | Performed by: PHYSICIAN ASSISTANT

## 2023-05-25 PROCEDURE — 3052F HG A1C>EQUAL 8.0%<EQUAL 9.0%: CPT | Performed by: PHYSICIAN ASSISTANT

## 2023-05-25 RX ORDER — PROCHLORPERAZINE 25 MG/1
SUPPOSITORY RECTAL
Qty: 3 EACH | Refills: 3 | Status: SHIPPED | OUTPATIENT
Start: 2023-05-25

## 2023-05-25 RX ORDER — BLOOD SUGAR DIAGNOSTIC
STRIP MISCELLANEOUS
Qty: 180 STRIP | Refills: 5 | Status: SHIPPED | OUTPATIENT
Start: 2023-05-25

## 2023-05-25 ASSESSMENT — ENCOUNTER SYMPTOMS
RHINORRHEA: 0
NAUSEA: 0
SORE THROAT: 0
ABDOMINAL PAIN: 0
COUGH: 0
DIARRHEA: 0
SHORTNESS OF BREATH: 0
CONSTIPATION: 0
COLOR CHANGE: 0
CHEST TIGHTNESS: 0
VOMITING: 0
SINUS PRESSURE: 0

## 2023-05-25 NOTE — PROGRESS NOTES
[facility] PRIMARY CARE  [address] [place] [id number]  Dept: Cinthya is a 80 y.o. female who presents today for her medical conditions/complaints as noted below. Chief Complaint   Patient presents with    Diabetes     Patient checks sugar daily about 6x a day. Patient wants to try a freestyle kavita . Last A1C was 8.1 on 23       HPI:     Diabetes  Pertinent negatives for hypoglycemia include no dizziness, headaches or seizures. Pertinent negatives for diabetes include no chest pain, no fatigue, no polydipsia, no polyphagia, no polyuria and no weakness. She is taking the 15U of Levemir qhs  See scanned glucose readings. Not taking meal time insulin and pp readings are still high  Started lisinopril and is tolerating.    Wants something besides finger sticks   LDL Calculated (mg/dL)   Date Value   2022 59   2021 66   07/10/2020 141       (goal LDL is <100)   AST (U/L)   Date Value   2022 19     ALT (U/L)   Date Value   2022 29     BUN (mg/dL)   Date Value   2023 56 (H)     BP Readings from Last 3 Encounters:   23 132/70   23 132/68   23 130/76          (goal 120/80)    Past Medical History:   Diagnosis Date    Cancer ([facility] [address]     breast cancer    Hypertension     Ischemic optic neuropathy     Type 2 diabetes mellitus without complication ([facility] [address]       Past Surgical History:   Procedure Laterality Date    BACK SURGERY      BREAST SURGERY      lumpectomy left    HYSTERECTOMY, VAGINAL         Family History   Problem Relation Age of Onset    Heart Attack Father     Diabetes type 2  Daughter        Social History     Tobacco Use    Smoking status: Former     Packs/day: 0.50     Years: 10.00     Pack years: 5.00     Types: Cigarettes     Quit date: 2018     Years since quittin.3    Smokeless tobacco: Never   Substance Use Topics    Alcohol use: Not Currently      Current Outpatient

## 2023-06-02 DIAGNOSIS — Z79.4 TYPE 2 DIABETES MELLITUS WITH DIABETIC MONONEUROPATHY, WITH LONG-TERM CURRENT USE OF INSULIN (HCC): ICD-10-CM

## 2023-06-02 DIAGNOSIS — E11.41 TYPE 2 DIABETES MELLITUS WITH DIABETIC MONONEUROPATHY, WITH LONG-TERM CURRENT USE OF INSULIN (HCC): ICD-10-CM

## 2023-06-02 RX ORDER — GABAPENTIN 100 MG/1
CAPSULE ORAL
Qty: 60 CAPSULE | Refills: 1 | Status: SHIPPED | OUTPATIENT
Start: 2023-06-02 | End: 2023-07-27

## 2023-06-02 NOTE — TELEPHONE ENCOUNTER
LAST VISIT:   5/25/2023     Future Appointments   Date Time Provider [address]   7/6/2023  1:30 PM JITENDRA Lind

## 2023-06-29 ENCOUNTER — TELEPHONE (OUTPATIENT)
Dept: PRIMARY CARE CLINIC | Age: 83
End: 2023-06-29

## 2023-06-29 DIAGNOSIS — Z79.4 TYPE 2 DIABETES MELLITUS WITH DIABETIC MONONEUROPATHY, WITH LONG-TERM CURRENT USE OF INSULIN (HCC): Primary | ICD-10-CM

## 2023-06-29 DIAGNOSIS — E11.41 TYPE 2 DIABETES MELLITUS WITH DIABETIC MONONEUROPATHY, WITH LONG-TERM CURRENT USE OF INSULIN (HCC): Primary | ICD-10-CM

## 2023-06-29 RX ORDER — BLOOD-GLUCOSE METER
1 KIT MISCELLANEOUS DAILY
Qty: 1 KIT | Refills: 0 | Status: CANCELLED | OUTPATIENT
Start: 2023-06-29

## 2023-06-30 RX ORDER — CALCIUM CITRATE/VITAMIN D3 200MG-6.25
TABLET ORAL
Qty: 100 STRIP | Refills: 5 | Status: SHIPPED | OUTPATIENT
Start: 2023-06-30

## 2023-07-06 ENCOUNTER — OFFICE VISIT (OUTPATIENT)
Dept: PRIMARY CARE CLINIC | Age: 83
End: 2023-07-06
Payer: COMMERCIAL

## 2023-07-06 VITALS
OXYGEN SATURATION: 98 % | BODY MASS INDEX: 26.09 KG/M2 | HEIGHT: 62 IN | SYSTOLIC BLOOD PRESSURE: 134 MMHG | DIASTOLIC BLOOD PRESSURE: 58 MMHG | WEIGHT: 141.8 LBS | HEART RATE: 70 BPM

## 2023-07-06 DIAGNOSIS — E11.41 TYPE 2 DIABETES MELLITUS WITH DIABETIC MONONEUROPATHY, WITH LONG-TERM CURRENT USE OF INSULIN (HCC): ICD-10-CM

## 2023-07-06 DIAGNOSIS — N18.4 CKD (CHRONIC KIDNEY DISEASE) STAGE 4, GFR 15-29 ML/MIN (HCC): ICD-10-CM

## 2023-07-06 DIAGNOSIS — Z79.4 TYPE 2 DIABETES MELLITUS WITH HYPERGLYCEMIA, WITH LONG-TERM CURRENT USE OF INSULIN (HCC): ICD-10-CM

## 2023-07-06 DIAGNOSIS — I10 ESSENTIAL HYPERTENSION: Primary | ICD-10-CM

## 2023-07-06 DIAGNOSIS — J44.1 CHRONIC OBSTRUCTIVE PULMONARY DISEASE WITH (ACUTE) EXACERBATION (HCC): ICD-10-CM

## 2023-07-06 DIAGNOSIS — Z79.4 TYPE 2 DIABETES MELLITUS WITH DIABETIC MONONEUROPATHY, WITH LONG-TERM CURRENT USE OF INSULIN (HCC): ICD-10-CM

## 2023-07-06 DIAGNOSIS — E11.65 TYPE 2 DIABETES MELLITUS WITH HYPERGLYCEMIA, WITH LONG-TERM CURRENT USE OF INSULIN (HCC): ICD-10-CM

## 2023-07-06 PROCEDURE — 3052F HG A1C>EQUAL 8.0%<EQUAL 9.0%: CPT | Performed by: PHYSICIAN ASSISTANT

## 2023-07-06 PROCEDURE — 99215 OFFICE O/P EST HI 40 MIN: CPT | Performed by: PHYSICIAN ASSISTANT

## 2023-07-06 PROCEDURE — 1123F ACP DISCUSS/DSCN MKR DOCD: CPT | Performed by: PHYSICIAN ASSISTANT

## 2023-07-06 PROCEDURE — 3078F DIAST BP <80 MM HG: CPT | Performed by: PHYSICIAN ASSISTANT

## 2023-07-06 PROCEDURE — 3075F SYST BP GE 130 - 139MM HG: CPT | Performed by: PHYSICIAN ASSISTANT

## 2023-07-06 SDOH — ECONOMIC STABILITY: HOUSING INSECURITY
IN THE LAST 12 MONTHS, WAS THERE A TIME WHEN YOU DID NOT HAVE A STEADY PLACE TO SLEEP OR SLEPT IN A SHELTER (INCLUDING NOW)?: NO

## 2023-07-06 SDOH — ECONOMIC STABILITY: INCOME INSECURITY: HOW HARD IS IT FOR YOU TO PAY FOR THE VERY BASICS LIKE FOOD, HOUSING, MEDICAL CARE, AND HEATING?: NOT HARD AT ALL

## 2023-07-06 SDOH — ECONOMIC STABILITY: FOOD INSECURITY: WITHIN THE PAST 12 MONTHS, THE FOOD YOU BOUGHT JUST DIDN'T LAST AND YOU DIDN'T HAVE MONEY TO GET MORE.: NEVER TRUE

## 2023-07-06 SDOH — ECONOMIC STABILITY: FOOD INSECURITY: WITHIN THE PAST 12 MONTHS, YOU WORRIED THAT YOUR FOOD WOULD RUN OUT BEFORE YOU GOT MONEY TO BUY MORE.: NEVER TRUE

## 2023-07-06 ASSESSMENT — ENCOUNTER SYMPTOMS
GASTROINTESTINAL NEGATIVE: 1
RESPIRATORY NEGATIVE: 1

## 2023-07-06 ASSESSMENT — PATIENT HEALTH QUESTIONNAIRE - PHQ9
SUM OF ALL RESPONSES TO PHQ QUESTIONS 1-9: 0
SUM OF ALL RESPONSES TO PHQ9 QUESTIONS 1 & 2: 0
SUM OF ALL RESPONSES TO PHQ QUESTIONS 1-9: 0
2. FEELING DOWN, DEPRESSED OR HOPELESS: 0
1. LITTLE INTEREST OR PLEASURE IN DOING THINGS: 0

## 2023-07-06 ASSESSMENT — LIFESTYLE VARIABLES
HOW OFTEN DO YOU HAVE A DRINK CONTAINING ALCOHOL: PATIENT DECLINED
HOW MANY STANDARD DRINKS CONTAINING ALCOHOL DO YOU HAVE ON A TYPICAL DAY: PATIENT DECLINED

## 2023-07-06 NOTE — PROGRESS NOTES
[address] PRIMARY CARE  [address] [facility] [id number]  Dept: [id number] Howard Chandler is a 80 y.o. female who presents today for her medical conditions/complaints as noted below. Chief Complaint   Patient presents with    Diabetes       HPI:     HPI  She refused to do a AWV today     Did bring sugars readings and they have been too high--200--400. She could not get a CGM but I do not know why. She has a  with her today who tries to help her but otherwise she does not have active help at home. A daughter lives down the street from her but is having many problems herself and only checks in on her. Had decided against Dr. Aaron Orozco appt since it took them so long to call her. But after some discussion she says she will see the kidney doctor. REviewed last BUN/Cr.        LDL Calculated (mg/dL)   Date Value   2022 59   2021 66   07/10/2020 141       (goal LDL is <100)   AST (U/L)   Date Value   2022 19     ALT (U/L)   Date Value   2022 29     BUN (mg/dL)   Date Value   2023 56 (H)     BP Readings from Last 3 Encounters:   23 (!) 134/58   23 132/70   23 132/68          (goal 120/80)    Past Medical History:   Diagnosis Date    Cancer ([facility])     breast cancer    Hypertension     Ischemic optic neuropathy     Type 2 diabetes mellitus without complication ([facility])       Past Surgical History:   Procedure Laterality Date    BACK SURGERY      BREAST SURGERY      lumpectomy left    HYSTERECTOMY, VAGINAL         Family History   Problem Relation Age of Onset    Heart Attack Father     Diabetes type 2  Daughter        Social History     Tobacco Use    Smoking status: Former     Packs/day: 0.50     Years: 10.00     Pack years: 5.00     Types: Cigarettes     Quit date: 2018     Years since quittin.5    Smokeless tobacco: Never   Substance Use Topics    Alcohol use: Not Currently      Current Outpatient Medications

## 2023-07-13 RX ORDER — PIOGLITAZONEHYDROCHLORIDE 15 MG/1
15 TABLET ORAL DAILY
Qty: 30 TABLET | Refills: 3 | Status: SHIPPED | OUTPATIENT
Start: 2023-07-13

## 2023-07-13 NOTE — TELEPHONE ENCOUNTER
LAST VISIT:   7/6/2023     Future Appointments   Date Time Provider [address]   8/30/2023  9:10 AM CESAR Edwards - CNP [facility] None

## 2023-07-18 DIAGNOSIS — I10 ESSENTIAL HYPERTENSION: ICD-10-CM

## 2023-07-27 DIAGNOSIS — Z79.4 TYPE 2 DIABETES MELLITUS WITH DIABETIC MONONEUROPATHY, WITH LONG-TERM CURRENT USE OF INSULIN (HCC): ICD-10-CM

## 2023-07-27 DIAGNOSIS — E11.41 TYPE 2 DIABETES MELLITUS WITH DIABETIC MONONEUROPATHY, WITH LONG-TERM CURRENT USE OF INSULIN (HCC): ICD-10-CM

## 2023-07-27 RX ORDER — GABAPENTIN 100 MG/1
CAPSULE ORAL
Qty: 60 CAPSULE | Refills: 2 | Status: SHIPPED | OUTPATIENT
Start: 2023-07-27 | End: 2023-08-26

## 2023-07-27 NOTE — TELEPHONE ENCOUNTER
LAST VISIT:   7/6/2023     Future Appointments   Date Time Provider [address]   8/30/2023  9:10 AM Shaun Aguila APRN - CNP [facility] None           ;

## 2023-08-16 RX ORDER — LISINOPRIL 5 MG/1
TABLET ORAL
Qty: 90 TABLET | Refills: 1 | Status: SHIPPED | OUTPATIENT
Start: 2023-08-16

## 2023-09-27 ENCOUNTER — TELEPHONE (OUTPATIENT)
Dept: PRIMARY CARE CLINIC | Age: 83
End: 2023-09-27

## 2023-09-27 NOTE — TELEPHONE ENCOUNTER
[facility] calling, states they have been unable to reach pt to start care.  They went out one day to start and she was not home and have been unable to reach her via phone

## 2023-09-28 ENCOUNTER — CARE COORDINATION (OUTPATIENT)
Dept: CARE COORDINATION | Age: 83
End: 2023-09-28

## 2023-09-28 NOTE — CARE COORDINATION
PCP referral for care management. She referred patient for home health visits 7/6/23 and [facility] has been unable to contact patient to start visits. Attempted to call patient, left VM message asking patient to return call for care management assessment/enrollment. Will call again next week. No future appointments.

## 2023-10-09 ENCOUNTER — CARE COORDINATION (OUTPATIENT)
Dept: CARE COORDINATION | Age: 83
End: 2023-10-09

## 2023-10-09 NOTE — CARE COORDINATION
Attempted to call patient for care management review/enrollment, left VM message on her phone asking she return call. Spoke with daughter Justin Mcclellan, she had been admitted at [facility] [place] and has been in [facility] for a few weeks, is expected to discharge home 10/13. Admit 9/5-9/7 for diabetic ketoacidosis, admit 9/24-9/29 for COPD exacerbation, JENNIFER. Called and notified [facility], they had already signed off due to not able to admit. Writer will follow up next week. No future appointments.

## 2023-10-09 NOTE — CARE COORDINATION
Called and spoke to Leobardo, her daughter, to express my concerns with [facility] living at home and she tells me that the family has not been able to convince her to move to an assisted living facility. Please have [place] living contact Hernesto Yusef at [phone] for her home care as [facility] will not answer the phone. Please have Colleen schedule an appt with me soon. I asked Hernesto Holbrook to join her at that appt and she said she would try.

## 2023-10-10 RX ORDER — PIOGLITAZONEHYDROCHLORIDE 15 MG/1
15 TABLET ORAL DAILY
Qty: 30 TABLET | Refills: 3 | Status: SHIPPED | OUTPATIENT
Start: 2023-10-10

## 2023-10-16 ENCOUNTER — CARE COORDINATION (OUTPATIENT)
Dept: CARE COORDINATION | Age: 83
End: 2023-10-16

## 2023-10-16 DIAGNOSIS — E11.41 TYPE 2 DIABETES MELLITUS WITH DIABETIC MONONEUROPATHY, WITH LONG-TERM CURRENT USE OF INSULIN (HCC): ICD-10-CM

## 2023-10-16 DIAGNOSIS — Z79.4 TYPE 2 DIABETES MELLITUS WITH DIABETIC MONONEUROPATHY, WITH LONG-TERM CURRENT USE OF INSULIN (HCC): ICD-10-CM

## 2023-10-16 SDOH — ECONOMIC STABILITY: HOUSING INSECURITY: IN THE LAST 12 MONTHS, HOW MANY PLACES HAVE YOU LIVED?: 1

## 2023-10-16 SDOH — ECONOMIC STABILITY: INCOME INSECURITY: IN THE LAST 12 MONTHS, WAS THERE A TIME WHEN YOU WERE NOT ABLE TO PAY THE MORTGAGE OR RENT ON TIME?: NO

## 2023-10-16 ASSESSMENT — LIFESTYLE VARIABLES: HOW OFTEN DO YOU HAVE A DRINK CONTAINING ALCOHOL: NEVER

## 2023-10-16 NOTE — CARE COORDINATION
Ambulatory Care Coordination Note  10/16/2023    Patient Current Location: [address] [place] [address] spoke with daughter Waldemar Stevenson briefly, she was not able to talk long. Patent discharged home 10/13 from [facility]. She think is supposed to get home health visits but doesn't know what agency order was sent to, she hasn't viewed the discharge instructions yet. She stated her mom is doing well since being home, eating good, feels good. -She stated Jennifer Davenport is doing well, has no concerns about any symptoms or problems right now. She talks to her mom every day on the phone. Her mom has an emergency phone, she will only answer if Waldemar Stevenson or her sister calls, knows how to dial 911. Only phone number in chart is Tyra's. Reminded her that home health should have Tyra's phone number to call to schedule appts since patient won't answer phone.  -Waldemar Stevenson doesn't know about other doctors patient is supposed to follow up with (possibly pulmonology?) but she stated patient won't go to any doctors besides PCP. Waldemar Stevenson will call to schedule PCP appt then let Colleen's sister Dre Nava know since [employer] drives her to all appts.  -She has DM, HTN, COPD, CKD4 (supposed to see nephrology but wouldn't go to appt), is blind OS. Patient checks own blood sugar at home but daughter didn't know any recent numbers.  -No SDOH needs identified. Medications are blister packed and delivered from [facility]. Unable to review medications during this call.  -she uses a cane for ambulation, no falls. Has grab bars in bathroom and kitchen, only has 3 steps to get into house, no steps inside home. CC Plan:   -Writer called and left VM message asking Rula  to return call to find out which home health agency orders sent to, also asked for discharge instructions and med list be sent to PCP office.  -Will follow up in a few days to review medications, if home health scheduled/saw patient, if PCP appt made.   Offered patient enrollment in the Remote Patient

## 2023-10-17 NOTE — CARE COORDINATION
Courtney Rosenbegr from [employer] called, they ordered home health from [facility]. She faxed discharge instructions to PCP office. Writer called [facility], informed them need to call daughter Francisco Javier Johnson to schedule visits since patient does not answer phone, gave them daughter's phone number.

## 2023-10-18 RX ORDER — BUDESONIDE AND FORMOTEROL FUMARATE DIHYDRATE 80; 4.5 UG/1; UG/1
2 AEROSOL RESPIRATORY (INHALATION) 2 TIMES DAILY
Qty: 10.2 G | Refills: 0 | Status: SHIPPED | OUTPATIENT
Start: 2023-10-18

## 2023-10-18 RX ORDER — GABAPENTIN 100 MG/1
CAPSULE ORAL
Qty: 60 CAPSULE | Refills: 2 | Status: SHIPPED | OUTPATIENT
Start: 2023-10-18 | End: 2023-11-17

## 2023-10-18 RX ORDER — SODIUM BICARBONATE 650 MG/1
650 TABLET ORAL 2 TIMES DAILY
Qty: 60 TABLET | Refills: 0 | Status: SHIPPED | OUTPATIENT
Start: 2023-10-18 | End: 2023-11-15

## 2023-10-23 ENCOUNTER — CARE COORDINATION (OUTPATIENT)
Dept: CARE COORDINATION | Age: 83
End: 2023-10-23

## 2023-10-23 NOTE — CARE COORDINATION
Left VM message on daughter Tyra's phone asking for return call for care management follow up, review medications, see is  home health started. Will call again in a few days.

## 2023-10-25 NOTE — CARE COORDINATION
Attempted to call [facility] to see if they started home care visits yet, unable to speak with anyone or leave a message. Left message asking daughter Hernesto Holbrook return call for an update. Will call again next week. Still does not have an appt with PCP scheduled. No future appointments.

## 2023-10-26 RX ORDER — SODIUM BICARBONATE 650 MG/1
650 TABLET ORAL 2 TIMES DAILY
Qty: 60 TABLET | Refills: 0 | OUTPATIENT
Start: 2023-10-26

## 2023-10-31 RX ORDER — SODIUM BICARBONATE 650 MG/1
650 TABLET ORAL 2 TIMES DAILY
Qty: 60 TABLET | Refills: 0 | OUTPATIENT
Start: 2023-10-31

## 2023-11-01 ENCOUNTER — CARE COORDINATION (OUTPATIENT)
Dept: CARE COORDINATION | Age: 83
End: 2023-11-01

## 2023-11-01 NOTE — CARE COORDINATION
Attempted to call for care management, see if home health visits had started. Left VM message on daughter Tyra's phone asking for return call. Attempted to call [facility], unable to leave a message or speak with anyone, problems with their phone. Will call again next week.     Future Appointments   Date Time Provider [address]   11/28/2023  1:30 PM JITENDRA Colon

## 2023-11-02 ENCOUNTER — TELEPHONE (OUTPATIENT)
Dept: PRIMARY CARE CLINIC | Age: 83
End: 2023-11-02

## 2023-11-02 RX ORDER — FLASH GLUCOSE SENSOR
KIT MISCELLANEOUS
Qty: 6 EACH | Refills: 3 | Status: SHIPPED | OUTPATIENT
Start: 2023-11-02

## 2023-11-02 NOTE — TELEPHONE ENCOUNTER
[facility] from [address] [place] [address] is calling to request sensors for patients Freestyle Natalia. Pharmacy confirmed.

## 2023-11-07 ENCOUNTER — CARE COORDINATION (OUTPATIENT)
Dept: CARE COORDINATION | Age: 83
End: 2023-11-07

## 2023-11-07 NOTE — CARE COORDINATION
Ambulatory Care Coordination Note  2023    Patient Current Location:  Home: [address]  [address] contacted the family by telephone. Verified name and  with family as identifiers. Spoke with daughter Nawaf Montelongo. She stated patient doing very well, she has no concerns, no new symptoms. They got Freestyle Nicolas sensor and patient now wearing and checking blood sugars with it, she didn't know any blood sugar numbers. Patient getting nursing visits from [facility]. Nawaf Montelongo denied need for care management calls. Writer spoke with Polina Fisher at [facility], she will have the nurse call writer  during or after visit to review blood sugars and resp assessment, any safety concerns or medication issues (recent admits were for DKA and COPD). She is currently getting twice a week nursing visits, will reduce to once a week next week. Will sign off from care management after hears back from [address] [facility]. Offered patient enrollment in the Remote Patient Monitoring (RPM) program for in-home monitoring: Yes, but did not enroll at this time.     Lab Results       None            Care Coordination Interventions    Referral from Primary Care Provider: Yes  Suggested Interventions and Community Resources  Medi Set or Pill Pack: Completed          Goals Addressed    None         Future Appointments   Date Time Provider [address]   2023  1:30 PM JITENDRA Conner

## 2023-11-15 ENCOUNTER — CARE COORDINATION (OUTPATIENT)
Dept: CARE COORDINATION | Age: 83
End: 2023-11-15

## 2023-11-15 RX ORDER — SODIUM BICARBONATE 650 MG/1
650 TABLET ORAL 2 TIMES DAILY
Qty: 60 TABLET | Refills: 3 | Status: SHIPPED | OUTPATIENT
Start: 2023-11-15

## 2023-11-15 NOTE — CARE COORDINATION
Writer spoke with [facility] to get an update on patient. The nurse applied the Freestyle Nicolas sensor at last visit 11/6 and educated patient and daughter [employer] on how to use but daughter has declined nursing visits since then so nurse has not been able to evaluate the use and understanding of it, doesn't know what blood sugars are. Nurse did note patient taking medications regularly, they are blister packed from [facility] and no other concerns regarding patient at last visit. Signed off from care management since daughter stated at last call she didn't feel any further calls were necessary.     Future Appointments   Date Time Provider [address] [facility]   11/28/2023  1:30 PM Tayo Saner, PA-C [facility]

## 2023-11-28 ENCOUNTER — OFFICE VISIT (OUTPATIENT)
Dept: PRIMARY CARE CLINIC | Age: 83
End: 2023-11-28
Payer: COMMERCIAL

## 2023-11-28 VITALS
DIASTOLIC BLOOD PRESSURE: 80 MMHG | WEIGHT: 140.8 LBS | SYSTOLIC BLOOD PRESSURE: 130 MMHG | OXYGEN SATURATION: 99 % | HEIGHT: 62 IN | BODY MASS INDEX: 25.91 KG/M2 | HEART RATE: 78 BPM

## 2023-11-28 DIAGNOSIS — I11.0 HYPERTENSIVE HEART DISEASE WITH HEART FAILURE (HCC): ICD-10-CM

## 2023-11-28 DIAGNOSIS — E11.41 TYPE 2 DIABETES MELLITUS WITH DIABETIC MONONEUROPATHY, WITH LONG-TERM CURRENT USE OF INSULIN (HCC): Primary | ICD-10-CM

## 2023-11-28 DIAGNOSIS — N18.4 CKD (CHRONIC KIDNEY DISEASE) STAGE 4, GFR 15-29 ML/MIN (HCC): ICD-10-CM

## 2023-11-28 DIAGNOSIS — Z79.4 TYPE 2 DIABETES MELLITUS WITH DIABETIC MONONEUROPATHY, WITH LONG-TERM CURRENT USE OF INSULIN (HCC): Primary | ICD-10-CM

## 2023-11-28 DIAGNOSIS — Z23 NEED FOR VACCINATION: ICD-10-CM

## 2023-11-28 DIAGNOSIS — J44.1 CHRONIC OBSTRUCTIVE PULMONARY DISEASE WITH (ACUTE) EXACERBATION (HCC): ICD-10-CM

## 2023-11-28 PROBLEM — E11.10 DIABETIC ACIDOSIS WITHOUT COMA (HCC): Status: ACTIVE | Noted: 2023-09-05

## 2023-11-28 PROBLEM — N17.0 ACUTE KIDNEY FAILURE WITH TUBULAR NECROSIS (HCC): Status: ACTIVE | Noted: 2023-09-29

## 2023-11-28 LAB
CREATININE URINE POCT: ABNORMAL
HBA1C MFR BLD: 8.1 %
MICROALBUMIN/CREAT 24H UR: ABNORMAL MG/G{CREAT}
MICROALBUMIN/CREAT UR-RTO: ABNORMAL

## 2023-11-28 PROCEDURE — 83036 HEMOGLOBIN GLYCOSYLATED A1C: CPT | Performed by: PHYSICIAN ASSISTANT

## 2023-11-28 PROCEDURE — 3075F SYST BP GE 130 - 139MM HG: CPT | Performed by: PHYSICIAN ASSISTANT

## 2023-11-28 PROCEDURE — G0008 ADMIN INFLUENZA VIRUS VAC: HCPCS | Performed by: PHYSICIAN ASSISTANT

## 2023-11-28 PROCEDURE — 3052F HG A1C>EQUAL 8.0%<EQUAL 9.0%: CPT | Performed by: PHYSICIAN ASSISTANT

## 2023-11-28 PROCEDURE — 99214 OFFICE O/P EST MOD 30 MIN: CPT | Performed by: PHYSICIAN ASSISTANT

## 2023-11-28 PROCEDURE — 90694 VACC AIIV4 NO PRSRV 0.5ML IM: CPT | Performed by: PHYSICIAN ASSISTANT

## 2023-11-28 PROCEDURE — 1123F ACP DISCUSS/DSCN MKR DOCD: CPT | Performed by: PHYSICIAN ASSISTANT

## 2023-11-28 PROCEDURE — 3079F DIAST BP 80-89 MM HG: CPT | Performed by: PHYSICIAN ASSISTANT

## 2023-11-28 PROCEDURE — 82044 UR ALBUMIN SEMIQUANTITATIVE: CPT | Performed by: PHYSICIAN ASSISTANT

## 2023-11-28 RX ORDER — NYSTATIN 100000 [USP'U]/G
1 POWDER TOPICAL 2 TIMES DAILY
COMMUNITY
Start: 2023-09-07

## 2023-11-28 RX ORDER — PIOGLITAZONEHYDROCHLORIDE 30 MG/1
30 TABLET ORAL DAILY
Qty: 30 TABLET | Refills: 2 | Status: SHIPPED | OUTPATIENT
Start: 2023-11-28

## 2023-11-28 RX ORDER — SPIRONOLACTONE 25 MG/1
TABLET ORAL
COMMUNITY
Start: 2023-09-30

## 2023-11-28 ASSESSMENT — ENCOUNTER SYMPTOMS
SORE THROAT: 0
ABDOMINAL PAIN: 0
COLOR CHANGE: 0
COUGH: 0
NAUSEA: 0
SINUS PRESSURE: 0
VOMITING: 0
CHEST TIGHTNESS: 0
DIARRHEA: 0
CONSTIPATION: 0
SHORTNESS OF BREATH: 0
RHINORRHEA: 0

## 2023-11-28 NOTE — PROGRESS NOTES
E11.9 1 each 0    atorvastatin (LIPITOR) 40 MG tablet Take 1 tablet by mouth daily 30 tablet 11    ferrous sulfate (FE TABS 325) 325 (65 Fe) MG EC tablet Take 1 tablet by mouth 2 times daily 60 tablet 11    furosemide (LASIX) 40 MG tablet Take 1 tablet by mouth daily 30 tablet 11    traZODone (DESYREL) 50 MG tablet Take 1 tablet by mouth nightly 30 tablet 11    insulin lispro, 1 Unit Dial, (HUMALOG/ADMELOG) 100 UNIT/ML SOPN Inject 5 Units into the skin 3 times daily (before meals) 5 Adjustable Dose Pre-filled Pen Syringe 5    docusate sodium (COLACE) 100 MG capsule TAKE 1 CAPSULE BY MOUTH DAILY AS NEEDED FOR CONSTIPATION 30 capsule 1    aspirin EC 81 MG EC tablet Take 1 tablet by mouth daily 90 tablet 1     No current facility-administered medications for this visit. No Known Allergies    Health Maintenance   Topic Date Due    COVID-19 Vaccine (1) Never done    DTaP/Tdap/Td vaccine (1 - Tdap) Never done    Shingles vaccine (1 of 2) Never done    Hepatitis B vaccine (1 of 3 - Risk 3-dose series) Never done    Annual Wellness Visit (AWV)  08/11/2022    Flu vaccine (1) 08/01/2023    Depression Screen  07/06/2024    Pneumococcal 65+ years Vaccine  Completed    Hepatitis A vaccine  Aged Out    Hib vaccine  Aged Out    Meningococcal (ACWY) vaccine  Aged Out    Lipids  Discontinued       Subjective:     Review of Systems   Constitutional:  Negative for appetite change, chills, fatigue and fever. HENT:  Negative for dental problem, rhinorrhea, sinus pressure, sneezing and sore throat. Respiratory:  Negative for cough, chest tightness and shortness of breath. Cardiovascular:  Negative for chest pain, palpitations and leg swelling. Gastrointestinal:  Negative for abdominal pain, constipation, diarrhea, nausea and vomiting. Endocrine: Negative for polydipsia, polyphagia and polyuria. Genitourinary:  Negative for dysuria, frequency, hematuria and urgency. Skin:  Negative for color change and rash.

## 2023-11-29 LAB
ANION GAP SERPL CALCULATED.3IONS-SCNC: 15 MEQ/L (ref 7–16)
BUN BLDV-MCNC: 73 MG/DL (ref 8–23)
CALCIUM SERPL-MCNC: 9.3 MG/DL (ref 8.5–10.5)
CHLORIDE BLD-SCNC: 108 MEQ/L (ref 95–107)
CO2: 21 MEQ/L (ref 19–31)
CREAT SERPL-MCNC: 2.61 MG/DL (ref 0.6–1.3)
EGFR IF NONAFRICAN AMERICAN: 18 ML/MIN/1.73
GLUCOSE: 113 MG/DL (ref 70–99)
POTASSIUM SERPL-SCNC: 4.4 MEQ/L (ref 3.5–5.4)
SODIUM BLD-SCNC: 144 MEQ/L (ref 133–146)

## 2024-01-01 DIAGNOSIS — E11.41 TYPE 2 DIABETES MELLITUS WITH DIABETIC MONONEUROPATHY, WITH LONG-TERM CURRENT USE OF INSULIN (HCC): ICD-10-CM

## 2024-01-01 DIAGNOSIS — Z79.4 TYPE 2 DIABETES MELLITUS WITH DIABETIC MONONEUROPATHY, WITH LONG-TERM CURRENT USE OF INSULIN (HCC): ICD-10-CM

## 2024-01-01 RX ORDER — GABAPENTIN 100 MG/1
100 CAPSULE ORAL 2 TIMES DAILY
Qty: 60 CAPSULE | Refills: 2 | Status: CANCELLED | OUTPATIENT
Start: 2024-01-01

## 2024-01-03 DIAGNOSIS — I10 ESSENTIAL HYPERTENSION: ICD-10-CM

## 2024-01-03 DIAGNOSIS — N18.9 ANEMIA IN CHRONIC KIDNEY DISEASE, UNSPECIFIED CKD STAGE: ICD-10-CM

## 2024-01-03 DIAGNOSIS — D63.1 ANEMIA IN CHRONIC KIDNEY DISEASE, UNSPECIFIED CKD STAGE: ICD-10-CM

## 2024-01-03 DIAGNOSIS — I11.0 HYPERTENSIVE HEART DISEASE WITH HEART FAILURE (HCC): ICD-10-CM

## 2024-01-03 RX ORDER — FUROSEMIDE 40 MG/1
40 TABLET ORAL DAILY
Qty: 30 TABLET | Refills: 5 | Status: SHIPPED | OUTPATIENT
Start: 2024-01-03

## 2024-01-03 RX ORDER — LANOLIN ALCOHOL/MO/W.PET/CERES
1 CREAM (GRAM) TOPICAL 2 TIMES DAILY
Qty: 60 TABLET | Refills: 5 | Status: SHIPPED | OUTPATIENT
Start: 2024-01-03

## 2024-01-16 DIAGNOSIS — Z79.4 TYPE 2 DIABETES MELLITUS WITH DIABETIC MONONEUROPATHY, WITH LONG-TERM CURRENT USE OF INSULIN (HCC): ICD-10-CM

## 2024-01-16 DIAGNOSIS — E11.41 TYPE 2 DIABETES MELLITUS WITH DIABETIC MONONEUROPATHY, WITH LONG-TERM CURRENT USE OF INSULIN (HCC): ICD-10-CM

## 2024-01-16 RX ORDER — GABAPENTIN 100 MG/1
100 CAPSULE ORAL 2 TIMES DAILY
Qty: 60 CAPSULE | Refills: 1 | Status: SHIPPED | OUTPATIENT
Start: 2024-01-16 | End: 2024-03-16

## 2024-01-23 ENCOUNTER — TELEPHONE (OUTPATIENT)
Dept: PRIMARY CARE CLINIC | Age: 84
End: 2024-01-23

## 2024-01-23 RX ORDER — FLUCONAZOLE 150 MG/1
TABLET ORAL
Qty: 2 TABLET | Refills: 0 | Status: SHIPPED | OUTPATIENT
Start: 2024-01-23

## 2024-01-23 NOTE — TELEPHONE ENCOUNTER
Ara (daughter) is requesting medication for patients possible vaginal yeast infection.    Ara states patient has c/o vaginal pain/discomfort as well as abnormal vaginal discharge for about 1 week.    Pharmacy confirmed. Please advise.

## 2024-01-29 DIAGNOSIS — E78.5 HYPERLIPIDEMIA, UNSPECIFIED HYPERLIPIDEMIA TYPE: ICD-10-CM

## 2024-01-29 RX ORDER — LISINOPRIL 5 MG/1
5 TABLET ORAL DAILY
Qty: 90 TABLET | Refills: 1 | Status: SHIPPED | OUTPATIENT
Start: 2024-01-29

## 2024-01-29 RX ORDER — PIOGLITAZONEHYDROCHLORIDE 15 MG/1
15 TABLET ORAL DAILY
Qty: 90 TABLET | Refills: 1 | Status: SHIPPED | OUTPATIENT
Start: 2024-01-29

## 2024-01-29 RX ORDER — ATORVASTATIN CALCIUM 40 MG/1
40 TABLET, FILM COATED ORAL DAILY
Qty: 90 TABLET | Refills: 3 | Status: SHIPPED | OUTPATIENT
Start: 2024-01-29

## 2024-01-29 NOTE — TELEPHONE ENCOUNTER
LAST VISIT:   11/28/2023     Future Appointments   Date Time Provider Department Center   3/5/2024  2:15 PM Moni Morejon PA-C [facility]       Pharmacy verified? Yes     [facility] - [place], [place] - [address] - P [phone] - F [phone]  [address]  [facility] [address]  Phone: [phone] Fax: [phone]

## 2024-02-06 ENCOUNTER — HOSPITAL ENCOUNTER (EMERGENCY)
Age: 84
Discharge: HOME | End: 2024-02-06
Attending: STUDENT IN AN ORGANIZED HEALTH CARE EDUCATION/TRAINING PROGRAM
Payer: COMMERCIAL

## 2024-02-06 VITALS
HEART RATE: 94 BPM | RESPIRATION RATE: 22 BRPM | DIASTOLIC BLOOD PRESSURE: 62 MMHG | TEMPERATURE: 97.6 F | BODY MASS INDEX: 26.72 KG/M2 | OXYGEN SATURATION: 94 % | WEIGHT: 145.2 LBS | HEIGHT: 62 IN | SYSTOLIC BLOOD PRESSURE: 183 MMHG

## 2024-02-06 DIAGNOSIS — R06.00 ACUTE DYSPNEA: Primary | ICD-10-CM

## 2024-02-06 DIAGNOSIS — J44.9 CHRONIC OBSTRUCTIVE PULMONARY DISEASE, UNSPECIFIED COPD TYPE (HCC): ICD-10-CM

## 2024-02-06 LAB
ANION GAP SERPL CALCULATED.3IONS-SCNC: 10 MMOL/L (ref 9–17)
BASOPHILS # BLD: 0 K/UL (ref 0–0.2)
BASOPHILS NFR BLD: 0 % (ref 0–2)
BNP SERPL-MCNC: ABNORMAL PG/ML
BUN SERPL-MCNC: 51 MG/DL (ref 8–23)
CALCIUM SERPL-MCNC: 8.9 MG/DL (ref 8.6–10.4)
CHLORIDE SERPL-SCNC: 112 MMOL/L (ref 98–107)
CO2 SERPL-SCNC: 24 MMOL/L (ref 20–31)
CREAT SERPL-MCNC: 1.9 MG/DL (ref 0.5–0.9)
D DIMER PPP FEU-MCNC: 6.75 UG/ML FEU
EOSINOPHIL # BLD: 0.2 K/UL (ref 0–0.4)
EOSINOPHILS RELATIVE PERCENT: 3 % (ref 1–4)
ERYTHROCYTE [DISTWIDTH] IN BLOOD BY AUTOMATED COUNT: 14.8 % (ref 12.5–15.4)
FLUAV AG SPEC QL: NEGATIVE
FLUBV AG SPEC QL: NEGATIVE
GFR SERPL CREATININE-BSD FRML MDRD: 26 ML/MIN/1.73M2
GLUCOSE SERPL-MCNC: 218 MG/DL (ref 70–99)
HCT VFR BLD AUTO: 27.8 % (ref 36–46)
HGB BLD-MCNC: 9.2 G/DL (ref 12–16)
LYMPHOCYTES NFR BLD: 1.5 K/UL (ref 1–4.8)
LYMPHOCYTES RELATIVE PERCENT: 18 % (ref 24–44)
MCH RBC QN AUTO: 31.1 PG (ref 26–34)
MCHC RBC AUTO-ENTMCNC: 33.3 G/DL (ref 31–37)
MCV RBC AUTO: 93.3 FL (ref 80–100)
MONOCYTES NFR BLD: 0.4 K/UL (ref 0.1–1.2)
MONOCYTES NFR BLD: 5 % (ref 2–11)
NEUTROPHILS NFR BLD: 74 % (ref 36–66)
NEUTS SEG NFR BLD: 6 K/UL (ref 1.8–7.7)
PLATELET # BLD AUTO: 257 K/UL (ref 140–450)
PMV BLD AUTO: 9.9 FL (ref 6–12)
POTASSIUM SERPL-SCNC: 4.7 MMOL/L (ref 3.7–5.3)
RBC # BLD AUTO: 2.97 M/UL (ref 4–5.2)
SARS-COV-2 RDRP RESP QL NAA+PROBE: NOT DETECTED
SODIUM SERPL-SCNC: 146 MMOL/L (ref 135–144)
SPECIMEN DESCRIPTION: NORMAL
TROPONIN I SERPL HS-MCNC: 122 NG/L (ref 0–14)
TROPONIN I SERPL HS-MCNC: 125 NG/L (ref 0–14)
WBC OTHER # BLD: 8 K/UL (ref 3.5–11)

## 2024-02-06 PROCEDURE — 94761 N-INVAS EAR/PLS OXIMETRY MLT: CPT

## 2024-02-06 PROCEDURE — 36415 COLL VENOUS BLD VENIPUNCTURE: CPT

## 2024-02-06 PROCEDURE — 83880 ASSAY OF NATRIURETIC PEPTIDE: CPT

## 2024-02-06 PROCEDURE — 6370000000 HC RX 637 (ALT 250 FOR IP): Performed by: EMERGENCY MEDICINE

## 2024-02-06 PROCEDURE — 85379 FIBRIN DEGRADATION QUANT: CPT

## 2024-02-06 PROCEDURE — 87804 INFLUENZA ASSAY W/OPTIC: CPT

## 2024-02-06 PROCEDURE — 94640 AIRWAY INHALATION TREATMENT: CPT

## 2024-02-06 PROCEDURE — 6360000002 HC RX W HCPCS: Performed by: EMERGENCY MEDICINE

## 2024-02-06 PROCEDURE — 84484 ASSAY OF TROPONIN QUANT: CPT

## 2024-02-06 PROCEDURE — 80048 BASIC METABOLIC PNL TOTAL CA: CPT

## 2024-02-06 PROCEDURE — 6370000000 HC RX 637 (ALT 250 FOR IP): Performed by: STUDENT IN AN ORGANIZED HEALTH CARE EDUCATION/TRAINING PROGRAM

## 2024-02-06 PROCEDURE — 85025 COMPLETE CBC W/AUTO DIFF WBC: CPT

## 2024-02-06 PROCEDURE — 93005 ELECTROCARDIOGRAM TRACING: CPT | Performed by: STUDENT IN AN ORGANIZED HEALTH CARE EDUCATION/TRAINING PROGRAM

## 2024-02-06 PROCEDURE — 87635 SARS-COV-2 COVID-19 AMP PRB: CPT

## 2024-02-06 PROCEDURE — 99284 EMERGENCY DEPT VISIT MOD MDM: CPT

## 2024-02-06 PROCEDURE — 96372 THER/PROPH/DIAG INJ SC/IM: CPT

## 2024-02-06 PROCEDURE — 2700000000 HC OXYGEN THERAPY PER DAY

## 2024-02-06 RX ORDER — IPRATROPIUM BROMIDE AND ALBUTEROL SULFATE 2.5; .5 MG/3ML; MG/3ML
1 SOLUTION RESPIRATORY (INHALATION) ONCE
Status: COMPLETED | OUTPATIENT
Start: 2024-02-06 | End: 2024-02-06

## 2024-02-06 RX ORDER — ALBUTEROL SULFATE 2.5 MG/3ML
2.5 SOLUTION RESPIRATORY (INHALATION) ONCE
Status: COMPLETED | OUTPATIENT
Start: 2024-02-06 | End: 2024-02-06

## 2024-02-06 RX ORDER — FUROSEMIDE 10 MG/ML
40 INJECTION INTRAMUSCULAR; INTRAVENOUS ONCE
Status: DISCONTINUED | OUTPATIENT
Start: 2024-02-06 | End: 2024-02-06 | Stop reason: HOSPADM

## 2024-02-06 RX ORDER — MAGNESIUM SULFATE 1 G/100ML
1000 INJECTION INTRAVENOUS ONCE
Status: DISCONTINUED | OUTPATIENT
Start: 2024-02-06 | End: 2024-02-06

## 2024-02-06 RX ORDER — IPRATROPIUM BROMIDE AND ALBUTEROL SULFATE 2.5; .5 MG/3ML; MG/3ML
1 SOLUTION RESPIRATORY (INHALATION)
Status: DISCONTINUED | OUTPATIENT
Start: 2024-02-06 | End: 2024-02-06 | Stop reason: HOSPADM

## 2024-02-06 RX ORDER — LORAZEPAM 0.5 MG/1
0.5 TABLET ORAL ONCE
Status: COMPLETED | OUTPATIENT
Start: 2024-02-06 | End: 2024-02-06

## 2024-02-06 RX ORDER — FUROSEMIDE 10 MG/ML
60 INJECTION INTRAMUSCULAR; INTRAVENOUS ONCE
Status: COMPLETED | OUTPATIENT
Start: 2024-02-06 | End: 2024-02-06

## 2024-02-06 RX ADMIN — FUROSEMIDE 60 MG: 10 INJECTION, SOLUTION INTRAMUSCULAR; INTRAVENOUS at 07:45

## 2024-02-06 RX ADMIN — LORAZEPAM 0.5 MG: 0.5 TABLET ORAL at 14:01

## 2024-02-06 RX ADMIN — ALBUTEROL SULFATE 2.5 MG: 2.5 SOLUTION RESPIRATORY (INHALATION) at 13:38

## 2024-02-06 RX ADMIN — METHYLPREDNISOLONE SODIUM SUCCINATE 125 MG: 125 INJECTION, POWDER, FOR SOLUTION INTRAMUSCULAR; INTRAVENOUS at 07:41

## 2024-02-06 RX ADMIN — IPRATROPIUM BROMIDE AND ALBUTEROL SULFATE 1 DOSE: .5; 2.5 SOLUTION RESPIRATORY (INHALATION) at 06:48

## 2024-02-06 RX ADMIN — IPRATROPIUM BROMIDE AND ALBUTEROL SULFATE 1 DOSE: .5; 2.5 SOLUTION RESPIRATORY (INHALATION) at 11:29

## 2024-02-06 ASSESSMENT — ENCOUNTER SYMPTOMS
SORE THROAT: 0
NAUSEA: 0
BACK PAIN: 0
COUGH: 1
BLOOD IN STOOL: 0
DIARRHEA: 0
WHEEZING: 0
FACIAL SWELLING: 0
ABDOMINAL PAIN: 0
SHORTNESS OF BREATH: 1
VOMITING: 0

## 2024-02-06 ASSESSMENT — PAIN - FUNCTIONAL ASSESSMENT: PAIN_FUNCTIONAL_ASSESSMENT: NONE - DENIES PAIN

## 2024-02-06 NOTE — ED NOTES
Called [facility] to let them know the pt was returning and what the plan was with the son, RN had no further questions

## 2024-02-06 NOTE — DISCHARGE INSTRUCTIONS
PLEASE RETURN TO THE EMERGENCY DEPARTMENT IMMEDIATELY if your symptoms worsen in anyway or in 1-2 days if not improved for re-evaluation.  You should immediately return to the ER for symptoms such as new or worsening pain, difficulty breathing or swallowing, a change in your voice, a feeling of passing out, light headed, dizziness, chest pain, headache, neck pain, rash, abdominal pain or vomiting.    Take your medication as indicated and prescribed.  If you are given an antibiotic then, make sure you get the prescription filled and take the antibiotics until finished.      Please understand that at this time there is no evidence for a more serious underlying process, but that early in the process of an illness or injury, an emergency department workup can be falsely reassuring.  You should contact your family doctor within the next 48 hours for a follow up appointment.        THANK YOU!!!    From [facility] and [facility]    On behalf of the Emergency Department staff at [facility], I would like to thank you for giving us the opportunity to address your health care needs and concerns.    We hope that during your visit, our service was delivered in a professional and caring manner. Please keep [facility] in mind as we walk with you down the path to your own personal wellness.     Please expect an automated text message or email from us so we can ask a few questions about your health and progress. Based on your answers, a clinician may call you back to offer help and instructions.    Please understand that early in the process of an illness or injury, an emergency department workup can be falsely reassuring.  If you notice any worsening, changing or persistent symptoms please call your family doctor or return to the ER immediately.     Tell us how we did during your visit at [email or web address]   and let us know about your experience

## 2024-02-06 NOTE — ED PROVIDER NOTES
MG TABLET    TAKE ONE TABLET BY MOUTH EVERY DAY    PIOGLITAZONE (ACTOS) 30 MG TABLET    Take 1 tablet by mouth daily    SODIUM BICARBONATE 650 MG TABLET    TAKE 1 TABLET BY MOUTH 2 TIMES DAILY    SPIRONOLACTONE (ALDACTONE) 25 MG TABLET        TRAZODONE (DESYREL) 50 MG TABLET    Take 1 tablet by mouth nightly    TRUE METRIX BLOOD GLUCOSE TEST STRIP    USE 6 TIMES A DAY       ALLERGIES     Patient has no known allergies.    FAMILY HISTORY       Family History   Problem Relation Age of Onset    Heart Attack Father     Diabetes type 2  Daughter           SOCIAL HISTORY       Social History     Socioeconomic History    Marital status:    Tobacco Use    Smoking status: Former     Current packs/day: 0.00     Average packs/day: 0.5 packs/day for 10.0 years (5.0 ttl pk-yrs)     Types: Cigarettes     Start date: 2008     Quit date: 2018     Years since quittin.1    Smokeless tobacco: Never   Vaping Use    Vaping Use: Never used   Substance and Sexual Activity    Alcohol use: Not Currently    Drug use: Not Currently     Social Determinants of Health     Financial Resource Strain: Low Risk  (2023)    Overall Financial Resource Strain (CARDIA)     Difficulty of Paying Living Expenses: Not hard at all   Transportation Needs: No Transportation Needs (10/16/2023)    PRAPARE - Transportation     Lack of Transportation (Medical): No     Lack of Transportation (Non-Medical): No   Stress: No Stress Concern Present (10/16/2023)    [employer] - Occupational Stress Questionnaire     Feeling of Stress : Only a little   Housing Stability: Low Risk  (10/16/2023)    Housing Stability Vital Sign     Unable to Pay for Housing in the Last Year: No     Number of Places Lived in the Last Year: 1     Unstable Housing in the Last Year: No       SCREENINGS        [place] Coma Scale  Eye Opening: Spontaneous  Best Verbal Response: Oriented  Best Motor Response: Obeys commands  [place] Coma Scale

## 2024-02-06 NOTE — ED PROVIDER NOTES
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 - Potential duplicate medications found. Please discuss with provider.          ALLERGIES     has No Known Allergies.    FAMILY HISTORY     She indicated that her mother is . She indicated that her father is . She indicated that the status of her daughter is unknown.     family history includes Diabetes type 2  in her daughter; Heart Attack in her father.    SOCIAL HISTORY      reports that she quit smoking about 6 years ago. Her smoking use included cigarettes. She started smoking about 16 years ago. She has a 5.0 pack-year smoking history. She has never used smokeless tobacco. She reports that she does not currently use alcohol. She reports that she does not currently use drugs.      DIAGNOSTIC RESULTS     EKG: All EKG's are interpreted by the Emergency Department Physician who either signs or Co-signs this chart in the absence of a cardiologist.        RADIOLOGY:   Non-plain film images such as CT, Ultrasound and MRI are read by the radiologist. Plain radiographic images are visualized and the

## 2024-02-06 NOTE — ED NOTES
Pt L nare began to bleed, after exam nare appears dry, placed small amount of lube at base of nare

## 2024-02-07 LAB
EKG ATRIAL RATE: 92 BPM
EKG P AXIS: 25 DEGREES
EKG P-R INTERVAL: 144 MS
EKG Q-T INTERVAL: 376 MS
EKG QRS DURATION: 70 MS
EKG QTC CALCULATION (BAZETT): 464 MS
EKG R AXIS: -22 DEGREES
EKG T AXIS: 9 DEGREES
EKG VENTRICULAR RATE: 92 BPM